# Patient Record
Sex: FEMALE | Race: WHITE | NOT HISPANIC OR LATINO | Employment: UNEMPLOYED | URBAN - METROPOLITAN AREA
[De-identification: names, ages, dates, MRNs, and addresses within clinical notes are randomized per-mention and may not be internally consistent; named-entity substitution may affect disease eponyms.]

---

## 2019-06-07 ENCOUNTER — MEDICAL CORRESPONDENCE (OUTPATIENT)
Dept: HEALTH INFORMATION MANAGEMENT | Facility: CLINIC | Age: 64
End: 2019-06-07

## 2019-06-12 ENCOUNTER — DOCUMENTATION ONLY (OUTPATIENT)
Dept: TRANSPLANT | Facility: CLINIC | Age: 64
End: 2019-06-12

## 2019-06-19 PROBLEM — K70.31 ALCOHOLIC CIRRHOSIS OF LIVER WITH ASCITES (H): Status: ACTIVE | Noted: 2019-06-19

## 2019-06-20 ENCOUNTER — TELEPHONE (OUTPATIENT)
Dept: TRANSPLANT | Facility: CLINIC | Age: 64
End: 2019-06-20

## 2019-06-25 ENCOUNTER — OFFICE VISIT (OUTPATIENT)
Dept: GASTROENTEROLOGY | Facility: CLINIC | Age: 64
End: 2019-06-25
Attending: INTERNAL MEDICINE
Payer: COMMERCIAL

## 2019-06-25 ENCOUNTER — APPOINTMENT (OUTPATIENT)
Dept: TRANSPLANT | Facility: CLINIC | Age: 64
End: 2019-06-25
Attending: INTERNAL MEDICINE
Payer: COMMERCIAL

## 2019-06-25 VITALS
RESPIRATION RATE: 18 BRPM | HEART RATE: 58 BPM | TEMPERATURE: 98 F | WEIGHT: 143.3 LBS | HEIGHT: 66 IN | DIASTOLIC BLOOD PRESSURE: 64 MMHG | BODY MASS INDEX: 23.03 KG/M2 | SYSTOLIC BLOOD PRESSURE: 121 MMHG

## 2019-06-25 DIAGNOSIS — K70.31 ALCOHOLIC CIRRHOSIS OF LIVER WITH ASCITES (H): ICD-10-CM

## 2019-06-25 LAB
ALBUMIN SERPL-MCNC: 2.3 G/DL (ref 3.4–5)
ALP SERPL-CCNC: 386 U/L (ref 40–150)
ALT SERPL W P-5'-P-CCNC: 23 U/L (ref 0–50)
ANION GAP SERPL CALCULATED.3IONS-SCNC: 8 MMOL/L (ref 3–14)
AST SERPL W P-5'-P-CCNC: 45 U/L (ref 0–45)
BILIRUB DIRECT SERPL-MCNC: 1.3 MG/DL (ref 0–0.2)
BILIRUB SERPL-MCNC: 2.8 MG/DL (ref 0.2–1.3)
BUN SERPL-MCNC: 7 MG/DL (ref 7–30)
CALCIUM SERPL-MCNC: 9.3 MG/DL (ref 8.5–10.1)
CHLORIDE SERPL-SCNC: 101 MMOL/L (ref 94–109)
CO2 SERPL-SCNC: 26 MMOL/L (ref 20–32)
CREAT SERPL-MCNC: 0.65 MG/DL (ref 0.52–1.04)
ERYTHROCYTE [DISTWIDTH] IN BLOOD BY AUTOMATED COUNT: 15.9 % (ref 10–15)
GFR SERPL CREATININE-BSD FRML MDRD: >90 ML/MIN/{1.73_M2}
GLUCOSE SERPL-MCNC: 173 MG/DL (ref 70–99)
HCT VFR BLD AUTO: 36.4 % (ref 35–47)
HGB BLD-MCNC: 12.5 G/DL (ref 11.7–15.7)
INR PPP: 1.17 (ref 0.86–1.14)
MCH RBC QN AUTO: 32.7 PG (ref 26.5–33)
MCHC RBC AUTO-ENTMCNC: 34.3 G/DL (ref 31.5–36.5)
MCV RBC AUTO: 95 FL (ref 78–100)
PLATELET # BLD AUTO: 154 10E9/L (ref 150–450)
POTASSIUM SERPL-SCNC: 3.4 MMOL/L (ref 3.4–5.3)
PROT SERPL-MCNC: 6.4 G/DL (ref 6.8–8.8)
RBC # BLD AUTO: 3.82 10E12/L (ref 3.8–5.2)
SODIUM SERPL-SCNC: 135 MMOL/L (ref 133–144)
WBC # BLD AUTO: 7.8 10E9/L (ref 4–11)

## 2019-06-25 PROCEDURE — 85027 COMPLETE CBC AUTOMATED: CPT | Performed by: INTERNAL MEDICINE

## 2019-06-25 PROCEDURE — 80076 HEPATIC FUNCTION PANEL: CPT | Performed by: INTERNAL MEDICINE

## 2019-06-25 PROCEDURE — G0463 HOSPITAL OUTPT CLINIC VISIT: HCPCS | Mod: ZF

## 2019-06-25 PROCEDURE — 85610 PROTHROMBIN TIME: CPT | Performed by: INTERNAL MEDICINE

## 2019-06-25 PROCEDURE — 80048 BASIC METABOLIC PNL TOTAL CA: CPT | Performed by: INTERNAL MEDICINE

## 2019-06-25 PROCEDURE — 36415 COLL VENOUS BLD VENIPUNCTURE: CPT | Performed by: INTERNAL MEDICINE

## 2019-06-25 RX ORDER — LACTULOSE 10 G/15ML
30 SOLUTION ORAL 3 TIMES DAILY
COMMUNITY
Start: 2019-05-13

## 2019-06-25 RX ORDER — POTASSIUM CHLORIDE 750 MG/1
10 CAPSULE, EXTENDED RELEASE ORAL EVERY EVENING
COMMUNITY
Start: 2019-04-03

## 2019-06-25 RX ORDER — FUROSEMIDE 20 MG
TABLET ORAL
Refills: 11 | Status: ON HOLD | COMMUNITY
Start: 2019-06-07 | End: 2019-09-30 | Stop reason: DRUGHIGH

## 2019-06-25 RX ORDER — SPIRONOLACTONE 50 MG/1
50 TABLET, FILM COATED ORAL 2 TIMES DAILY
Refills: 3 | COMMUNITY
Start: 2019-06-07

## 2019-06-25 ASSESSMENT — PAIN SCALES - GENERAL: PAINLEVEL: NO PAIN (0)

## 2019-06-25 ASSESSMENT — MIFFLIN-ST. JEOR: SCORE: 1231.75

## 2019-06-25 NOTE — PROGRESS NOTES
[facility]    Hepatology New Patient Visit    Referring provider:  Irene RIDLEY    CHIEF COMPLAINT/REASON FOR THE VISIT:  Alcoholic hepatitis/alcoholic cirrhosis.      HISTORY OF PRESENT ILLNESS:  Ms. Fatima is a 63-year-old white female with significant alcohol abuse and she is coming to us for transplant evaluation.  In summary, it appears that she was drinking a significant amount of alcohol, and she did develop alcoholic hepatitis/cirrhosis.  At that time, she did present with fluid retention and did require close to 5 paracentesis.  Her liver related medical  problems started almost 6 months ago.  At that time, she did get a 30-day course of prednisolone for the AH.  She did not have any significant confusion or memory issues.  She did not have any GI bleeding like hematemesis, melena or hematochezia.  She did lose some muscle mass.  She lost also weight, although there was some fluctuation with the fluid changes.  She denies any abdominal pain.  She did not have any SBP.  Denies any nausea or vomiting now.     In 05/26 she did have some worsening of her labs; in fact, she did develop right upper quadrant abdominal pain with radiation to the back and she was diagnosed with what appeared to be choledocholithiasis and had ERCP with sphincterotomy done and her abdominal pain improved.  She is now staying without abdominal pain.  Please note also that initially she did have some jaundice and pruritus, but the pruritus continued while the jaundice quasi resolved.  She is on lactulose and gets 3-4 bowel movements.  Never had a colonoscopy.  She did have an EGD and this EGD showed portal hypertensive gastropathy but no esophageal varices.     Medical hx Surgical hx   Past Medical History:   Diagnosis Date     Acute alcoholic hepatitis      Cirrhosis (H)      H/O migraine       Past Surgical History:   Procedure Laterality Date     ABDOMEN SURGERY      C Section      GYN SURGERY    "   1993 and 1995 C Section           Medications  Prior to Admission medications    Medication Sig Start Date End Date Taking? Authorizing Provider   furosemide (LASIX) 20 MG tablet TAKE 3 TABLETS BY MOUTH EVERY DAY 6/7/19  Yes Reported, Patient   lactulose (CHRONULAC) 10 GM/15ML solution Take 30 mLs by mouth 5/13/19  Yes Reported, Patient   omeprazole (PRILOSEC) 20 MG DR capsule Take 20 mg by mouth 4/19/19  Yes Reported, Patient   potassium chloride ER (MICRO-K) 10 MEQ CR capsule Take 10 mEq by mouth 4/3/19  Yes Reported, Patient   spironolactone (ALDACTONE) 50 MG tablet TAKE 2 TABLETS BY MOUTH EVERY DAY 6/7/19  Yes Reported, Patient       Allergies  No Known Allergies    Family hx Social hx   Family History   Problem Relation Age of Onset     Diabetes Type 2  Mother      Coronary Artery Disease Mother      Liver Cancer Maternal Grandfather      Diabetes Type 2  Father      Cerebrovascular Disease Father      Diabetes Type 2  Brother       Social History     Tobacco Use     Smoking status: Current Every Day Smoker     Packs/day: 0.30     Years: 12.00     Pack years: 3.60     Types: Cigarettes     Smokeless tobacco: Never Used   Substance Use Topics     Alcohol use: Not Currently     Frequency: Never     Comment: Quit Jan 2, 2019. Drank 7 drinks a week.      Drug use: Never          REVIEW OF SYSTEMS:  Ms. Fatima denies any recent infections.  She has fatigue and weakness, but denies any headaches or seizures.  She has some cough as she is smoking, usually in the morning, and denies any shortness of breath.  Denies any chest pain.  Has some anemia or easy bruising.  She is not diabetic.  She has migraine headaches.  She has significant alcohol use.  She has tinnitus, otherwise she has no eye issues.  She has no joint problems.  Otherwise, a comprehensive review of systems was noncontributory.     Examination  /64   Pulse 58   Temp 98  F (36.7  C) (Oral)   Resp 18   Ht 1.676 m (5' 6\")   Wt 65 kg (143 lb " 4.8 oz)   BMI 23.13 kg/m    Body mass index is 23.13 kg/m .    Gen- well, NAD, A+Ox3, normal color  Eye- EOMI  ENT- MMM, normal oropharynx  Lym- no palpable lymphadenopathy  CVS- S1, S2 normal, no added sounds, RRR  RS- CTA  Abd- Distended. No shifting dullness.  Extr- pulses good, no RADHA  MS- hands normal- no clubbing  Neuro- A+Ox3, no asterixis  Skin- no rash or jaundice  Psych- normal mood    Laboratory  Lab Results   Component Value Date     06/25/2019    POTASSIUM 3.4 06/25/2019    CHLORIDE 101 06/25/2019    CO2 26 06/25/2019    BUN 7 06/25/2019    CR 0.65 06/25/2019       Lab Results   Component Value Date    BILITOTAL 2.8 06/25/2019    ALT 23 06/25/2019    AST 45 06/25/2019    ALKPHOS 386 06/25/2019       Lab Results   Component Value Date    ALBUMIN 2.3 06/25/2019    PROTTOTAL 6.4 06/25/2019        Lab Results   Component Value Date    WBC 7.8 06/25/2019    HGB 12.5 06/25/2019    MCV 95 06/25/2019     06/25/2019       Lab Results   Component Value Date    INR 1.17 06/25/2019     MELD-Na score: 14 at 6/25/2019  8:40 AM  MELD score: 12 at 6/25/2019  8:40 AM  Calculated from:  Serum Creatinine: 0.65 mg/dL (Rounded to 1 mg/dL) at 6/25/2019  8:40 AM  Serum Sodium: 135 mmol/L at 6/25/2019  8:40 AM  Total Bilirubin: 2.8 mg/dL at 6/25/2019  8:40 AM  INR(ratio): 1.17 at 6/25/2019  8:40 AM  Age: 63 years      Radiology    ASSESSMENT AND PLAN:  Ms. Fatima is a 63-year-old white female with a history significant of alcohol abuse who was at the end of last year, beginning of this year, did develop fluid retention which later on came to be related with alcoholic hepatitis/cirrhosis.  She did have around 5 paracenteses, but in the last 1 month she did not have any.  She did not have any esophageal varices on upper endoscopy.  She had also choledocholithiasis, which responded to sphincterotomy and ERCP extraction.  She still has her gallbladder.  Her MELD score on her labs here is 14, which is below the  listing criteria.  We advised her that she can follow up with her gastroenterologist at [facility] and if her MELD score goes up or if she decides to go forward with a living donor, she can return here.  In my opinion she has an alcoholic hepatitis component which has improved upon abstinence.  She can still improve.  As far as other causes of liver disease she had negative markers for autoimmune hepatitis and she has nonreactive viral hepatitis B, C.  Her iron studies showed no increased iron saturation and hence hemochromatosis will be less likely.  She has not done alpha-1 antitrypsin phenotype and she will do that through her primary care physician.  As far as a cholecystectomy is concerned, once her liver function tests stabilize she can come back here to be reevaluated for cholecystectomy.  Please note that her platelet count is 154,000.      This was a 1 hour visit, of which more than 50% was spent in explaining to the patient what our plan of care was.  We answered all of her questions and those of her .       cc:  Primary care physician     Sharmila Chaidez MD  Hepatology  [facility]

## 2019-06-25 NOTE — LETTER
6/25/2019      RE: Hazel Fatima  [address]  [facility] [address]       [facility]    Hepatology New Patient Visit    Referring provider:  Irene RIDLEY    CHIEF COMPLAINT/REASON FOR THE VISIT:  Alcoholic hepatitis/alcoholic cirrhosis.      HISTORY OF PRESENT ILLNESS:  Ms. Fatima is a 63-year-old white female with significant alcohol abuse and she is coming to us for transplant evaluation.  In summary, it appears that she was drinking a significant amount of alcohol, and she did develop alcoholic hepatitis/cirrhosis.  At that time, she did present with fluid retention and did require close to 5 paracentesis.  Her liver related medical  problems started almost 6 months ago.  At that time, she did get a 30-day course of prednisolone for the AH.  She did not have any significant confusion or memory issues.  She did not have any GI bleeding like hematemesis, melena or hematochezia.  She did lose some muscle mass.  She lost also weight, although there was some fluctuation with the fluid changes.  She denies any abdominal pain.  She did not have any SBP.  Denies any nausea or vomiting now.     In 05/26 she did have some worsening of her labs; in fact, she did develop right upper quadrant abdominal pain with radiation to the back and she was diagnosed with what appeared to be choledocholithiasis and had ERCP with sphincterotomy done and her abdominal pain improved.  She is now staying without abdominal pain.  Please note also that initially she did have some jaundice and pruritus, but the pruritus continued while the jaundice quasi resolved.  She is on lactulose and gets 3-4 bowel movements.  Never had a colonoscopy.  She did have an EGD and this EGD showed portal hypertensive gastropathy but no esophageal varices.     Medical hx Surgical hx   Past Medical History:   Diagnosis Date     Acute alcoholic hepatitis      Cirrhosis (H)      H/O migraine       Past Surgical History:    Procedure Laterality Date     ABDOMEN SURGERY      C Section      GYN SURGERY      1993 and 1995 C Section           Medications  Prior to Admission medications    Medication Sig Start Date End Date Taking? Authorizing Provider   furosemide (LASIX) 20 MG tablet TAKE 3 TABLETS BY MOUTH EVERY DAY 6/7/19  Yes Reported, Patient   lactulose (CHRONULAC) 10 GM/15ML solution Take 30 mLs by mouth 5/13/19  Yes Reported, Patient   omeprazole (PRILOSEC) 20 MG DR capsule Take 20 mg by mouth 4/19/19  Yes Reported, Patient   potassium chloride ER (MICRO-K) 10 MEQ CR capsule Take 10 mEq by mouth 4/3/19  Yes Reported, Patient   spironolactone (ALDACTONE) 50 MG tablet TAKE 2 TABLETS BY MOUTH EVERY DAY 6/7/19  Yes Reported, Patient       Allergies  No Known Allergies    Family hx Social hx   Family History   Problem Relation Age of Onset     Diabetes Type 2  Mother      Coronary Artery Disease Mother      Liver Cancer Maternal Grandfather      Diabetes Type 2  Father      Cerebrovascular Disease Father      Diabetes Type 2  Brother       Social History     Tobacco Use     Smoking status: Current Every Day Smoker     Packs/day: 0.30     Years: 12.00     Pack years: 3.60     Types: Cigarettes     Smokeless tobacco: Never Used   Substance Use Topics     Alcohol use: Not Currently     Frequency: Never     Comment: Quit Jan 2, 2019. Drank 7 drinks a week.      Drug use: Never          REVIEW OF SYSTEMS:  Ms. Fatima denies any recent infections.  She has fatigue and weakness, but denies any headaches or seizures.  She has some cough as she is smoking, usually in the morning, and denies any shortness of breath.  Denies any chest pain.  Has some anemia or easy bruising.  She is not diabetic.  She has migraine headaches.  She has significant alcohol use.  She has tinnitus, otherwise she has no eye issues.  She has no joint problems.  Otherwise, a comprehensive review of systems was noncontributory.     Examination  /64   Pulse 58    "Temp 98  F (36.7  C) (Oral)   Resp 18   Ht 1.676 m (5' 6\")   Wt 65 kg (143 lb 4.8 oz)   BMI 23.13 kg/m     Body mass index is 23.13 kg/m .    Gen- well, NAD, A+Ox3, normal color  Eye- EOMI  ENT- MMM, normal oropharynx  Lym- no palpable lymphadenopathy  CVS- S1, S2 normal, no added sounds, RRR  RS- CTA  Abd- Distended. No shifting dullness.  Extr- pulses good, no RADHA  MS- hands normal- no clubbing  Neuro- A+Ox3, no asterixis  Skin- no rash or jaundice  Psych- normal mood    Laboratory  Lab Results   Component Value Date     06/25/2019    POTASSIUM 3.4 06/25/2019    CHLORIDE 101 06/25/2019    CO2 26 06/25/2019    BUN 7 06/25/2019    CR 0.65 06/25/2019       Lab Results   Component Value Date    BILITOTAL 2.8 06/25/2019    ALT 23 06/25/2019    AST 45 06/25/2019    ALKPHOS 386 06/25/2019       Lab Results   Component Value Date    ALBUMIN 2.3 06/25/2019    PROTTOTAL 6.4 06/25/2019        Lab Results   Component Value Date    WBC 7.8 06/25/2019    HGB 12.5 06/25/2019    MCV 95 06/25/2019     06/25/2019       Lab Results   Component Value Date    INR 1.17 06/25/2019     MELD-Na score: 14 at 6/25/2019  8:40 AM  MELD score: 12 at 6/25/2019  8:40 AM  Calculated from:  Serum Creatinine: 0.65 mg/dL (Rounded to 1 mg/dL) at 6/25/2019  8:40 AM  Serum Sodium: 135 mmol/L at 6/25/2019  8:40 AM  Total Bilirubin: 2.8 mg/dL at 6/25/2019  8:40 AM  INR(ratio): 1.17 at 6/25/2019  8:40 AM  Age: 63 years      Radiology    ASSESSMENT AND PLAN:  Ms. Fatiam is a 63-year-old white female with a history significant of alcohol abuse who was at the end of last year, beginning of this year, did develop fluid retention which later on came to be related with alcoholic hepatitis/cirrhosis.  She did have around 5 paracenteses, but in the last 1 month she did not have any.  She did not have any esophageal varices on upper endoscopy.  She had also choledocholithiasis, which responded to sphincterotomy and ERCP extraction.  She still has " her gallbladder.  Her MELD score on her labs here is 14, which is below the listing criteria.  We advised her that she can follow up with her gastroenterologist at [facility] and if her MELD score goes up or if she decides to go forward with a living donor, she can return here.  In my opinion she has an alcoholic hepatitis component which has improved upon abstinence.  She can still improve.  As far as other causes of liver disease she had negative markers for autoimmune hepatitis and she has nonreactive viral hepatitis B, C.  Her iron studies showed no increased iron saturation and hence hemochromatosis will be less likely.  She has not done alpha-1 antitrypsin phenotype and she will do that through her primary care physician.  As far as a cholecystectomy is concerned, once her liver function tests stabilize she can come back here to be reevaluated for cholecystectomy.  Please note that her platelet count is 154,000.      This was a 1 hour visit, of which more than 50% was spent in explaining to the patient what our plan of care was.  We answered all of her questions and those of her .       cc:  Primary care physician     Sharmila Chaidez MD  Hepatology  [facility]        Sharmila Chaidez MD

## 2019-06-25 NOTE — LETTER
Date:June 27, 2019      Patient was self referred, no letter generated. Do not send.        [facility] Health Information

## 2019-06-25 NOTE — LETTER
6/25/2019       RE: Hazel Fatima  [address]  [place] [address]     Dear Colleague,    Thank you for referring your patient, Hazel Fatima, to the [facility] HEPATOLOGY at [facility]. Please see a copy of my visit note below.    [facility]    Hepatology New Patient Visit    Referring provider:  Irene RIDLEY    CHIEF COMPLAINT/REASON FOR THE VISIT:  Alcoholic hepatitis/alcoholic cirrhosis.      HISTORY OF PRESENT ILLNESS:  Ms. Fatima is a 63-year-old white female with significant alcohol abuse and she is coming to us for transplant evaluation.  In summary, it appears that she was drinking a significant amount of alcohol, and she did develop alcoholic hepatitis/cirrhosis.  At that time, she did present with fluid retention and did require close to 5 paracentesis.  Her liver related medical  problems started almost 6 months ago.  At that time, she did get a 30-day course of prednisolone for the AH.  She did not have any significant confusion or memory issues.  She did not have any GI bleeding like hematemesis, melena or hematochezia.  She did lose some muscle mass.  She lost also weight, although there was some fluctuation with the fluid changes.  She denies any abdominal pain.  She did not have any SBP.  Denies any nausea or vomiting now.     In 05/26 she did have some worsening of her labs; in fact, she did develop right upper quadrant abdominal pain with radiation to the back and she was diagnosed with what appeared to be choledocholithiasis and had ERCP with sphincterotomy done and her abdominal pain improved.  She is now staying without abdominal pain.  Please note also that initially she did have some jaundice and pruritus, but the pruritus continued while the jaundice quasi resolved.  She is on lactulose and gets 3-4 bowel movements.  Never had a colonoscopy.  She did have an EGD and this EGD showed portal hypertensive  gastropathy but no esophageal varices.     Medical hx Surgical hx   Past Medical History:   Diagnosis Date     Acute alcoholic hepatitis      Cirrhosis (H)      H/O migraine       Past Surgical History:   Procedure Laterality Date     ABDOMEN SURGERY      C Section      GYN SURGERY      1993 and 1995 C Section           Medications  Prior to Admission medications    Medication Sig Start Date End Date Taking? Authorizing Provider   furosemide (LASIX) 20 MG tablet TAKE 3 TABLETS BY MOUTH EVERY DAY 6/7/19  Yes Reported, Patient   lactulose (CHRONULAC) 10 GM/15ML solution Take 30 mLs by mouth 5/13/19  Yes Reported, Patient   omeprazole (PRILOSEC) 20 MG DR capsule Take 20 mg by mouth 4/19/19  Yes Reported, Patient   potassium chloride ER (MICRO-K) 10 MEQ CR capsule Take 10 mEq by mouth 4/3/19  Yes Reported, Patient   spironolactone (ALDACTONE) 50 MG tablet TAKE 2 TABLETS BY MOUTH EVERY DAY 6/7/19  Yes Reported, Patient       Allergies  No Known Allergies    Family hx Social hx   Family History   Problem Relation Age of Onset     Diabetes Type 2  Mother      Coronary Artery Disease Mother      Liver Cancer Maternal Grandfather      Diabetes Type 2  Father      Cerebrovascular Disease Father      Diabetes Type 2  Brother       Social History     Tobacco Use     Smoking status: Current Every Day Smoker     Packs/day: 0.30     Years: 12.00     Pack years: 3.60     Types: Cigarettes     Smokeless tobacco: Never Used   Substance Use Topics     Alcohol use: Not Currently     Frequency: Never     Comment: Quit Jan 2, 2019. Drank 7 drinks a week.      Drug use: Never          REVIEW OF SYSTEMS:  Ms. Fatima denies any recent infections.  She has fatigue and weakness, but denies any headaches or seizures.  She has some cough as she is smoking, usually in the morning, and denies any shortness of breath.  Denies any chest pain.  Has some anemia or easy bruising.  She is not diabetic.  She has migraine headaches.  She has significant  "alcohol use.  She has tinnitus, otherwise she has no eye issues.  She has no joint problems.  Otherwise, a comprehensive review of systems was noncontributory.     Examination  /64   Pulse 58   Temp 98  F (36.7  C) (Oral)   Resp 18   Ht 1.676 m (5' 6\")   Wt 65 kg (143 lb 4.8 oz)   BMI 23.13 kg/m     Body mass index is 23.13 kg/m .    Gen- well, NAD, A+Ox3, normal color  Eye- EOMI  ENT- MMM, normal oropharynx  Lym- no palpable lymphadenopathy  CVS- S1, S2 normal, no added sounds, RRR  RS- CTA  Abd- Distended. No shifting dullness.  Extr- pulses good, no RADHA  MS- hands normal- no clubbing  Neuro- A+Ox3, no asterixis  Skin- no rash or jaundice  Psych- normal mood    Laboratory  Lab Results   Component Value Date     06/25/2019    POTASSIUM 3.4 06/25/2019    CHLORIDE 101 06/25/2019    CO2 26 06/25/2019    BUN 7 06/25/2019    CR 0.65 06/25/2019       Lab Results   Component Value Date    BILITOTAL 2.8 06/25/2019    ALT 23 06/25/2019    AST 45 06/25/2019    ALKPHOS 386 06/25/2019       Lab Results   Component Value Date    ALBUMIN 2.3 06/25/2019    PROTTOTAL 6.4 06/25/2019        Lab Results   Component Value Date    WBC 7.8 06/25/2019    HGB 12.5 06/25/2019    MCV 95 06/25/2019     06/25/2019       Lab Results   Component Value Date    INR 1.17 06/25/2019     MELD-Na score: 14 at 6/25/2019  8:40 AM  MELD score: 12 at 6/25/2019  8:40 AM  Calculated from:  Serum Creatinine: 0.65 mg/dL (Rounded to 1 mg/dL) at 6/25/2019  8:40 AM  Serum Sodium: 135 mmol/L at 6/25/2019  8:40 AM  Total Bilirubin: 2.8 mg/dL at 6/25/2019  8:40 AM  INR(ratio): 1.17 at 6/25/2019  8:40 AM  Age: 63 years      Radiology    ASSESSMENT AND PLAN:  Ms. Fatima is a 63-year-old white female with a history significant of alcohol abuse who was at the end of last year, beginning of this year, did develop fluid retention which later on came to be related with alcoholic hepatitis/cirrhosis.  She did have around 5 paracenteses, but in " the last 1 month she did not have any.  She did not have any esophageal varices on upper endoscopy.  She had also choledocholithiasis, which responded to sphincterotomy and ERCP extraction.  She still has her gallbladder.  Her MELD score on her labs here is 14, which is below the listing criteria.  We advised her that she can follow up with her gastroenterologist at [facility] and if her MELD score goes up or if she decides to go forward with a living donor, she can return here.  In my opinion she has an alcoholic hepatitis component which has improved upon abstinence.  She can still improve.  As far as other causes of liver disease she had negative markers for autoimmune hepatitis and she has nonreactive viral hepatitis B, C.  Her iron studies showed no increased iron saturation and hence hemochromatosis will be less likely.  She has not done alpha-1 antitrypsin phenotype and she will do that through her primary care physician.  As far as a cholecystectomy is concerned, once her liver function tests stabilize she can come back here to be reevaluated for cholecystectomy.  Please note that her platelet count is 154,000.      This was a 1 hour visit, of which more than 50% was spent in explaining to the patient what our plan of care was.  We answered all of her questions and those of her .       cc:  Primary care physician     Sharmila Chaidez MD  Hepatology  [facility]        Again, thank you for allowing me to participate in the care of your patient.      Sincerely,    Sharmila Chaidez MD

## 2019-06-25 NOTE — LETTER
Date:June 27, 2019      Patient was self referred, no letter generated. Do not send.        [facility] Health Information

## 2019-06-25 NOTE — NURSING NOTE
"Chief Complaint   Patient presents with     Transplant Evaluation     Liver eval     Blood pressure 121/64, pulse 58, temperature 98  F (36.7  C), temperature source Oral, resp. rate 18, height 1.676 m (5' 6\"), weight 65 kg (143 lb 4.8 oz).    Mark Leos CMA on 6/25/2019 at 10:11 AM    "

## 2019-09-19 ENCOUNTER — DOCUMENTATION ONLY (OUTPATIENT)
Dept: CARE COORDINATION | Facility: CLINIC | Age: 64
End: 2019-09-19

## 2019-09-24 ENCOUNTER — TRANSFERRED RECORDS (OUTPATIENT)
Dept: HEALTH INFORMATION MANAGEMENT | Facility: CLINIC | Age: 64
End: 2019-09-24

## 2019-09-27 ENCOUNTER — PATIENT OUTREACH (OUTPATIENT)
Dept: SURGERY | Facility: CLINIC | Age: 64
End: 2019-09-27

## 2019-09-27 NOTE — PROGRESS NOTES
Pre Visit Call and Assessment    Date of call:  09/27/2019    Phone numbers:  Cell number on file:    Telephone Information:   Mobile [phone]       Reached patient/confirmed appointment:  No - left message:   on cell phone  Patient care team/Primary provider:  No Ref-Primary, Physician  Preferred outpatient pharmacy:  No Pharmacies Listed  Referred to:  Dr. Nasir Irwin    Reason for visit:  Consult- gallbladder

## 2019-09-29 ENCOUNTER — APPOINTMENT (OUTPATIENT)
Dept: ULTRASOUND IMAGING | Facility: CLINIC | Age: 64
DRG: 445 | End: 2019-09-29
Attending: FAMILY MEDICINE
Payer: COMMERCIAL

## 2019-09-29 ENCOUNTER — HOSPITAL ENCOUNTER (INPATIENT)
Facility: CLINIC | Age: 64
LOS: 4 days | Discharge: HOME | DRG: 445 | End: 2019-10-03
Attending: FAMILY MEDICINE | Admitting: INTERNAL MEDICINE
Payer: COMMERCIAL

## 2019-09-29 DIAGNOSIS — K80.50 RECURRENT BILIARY COLIC: ICD-10-CM

## 2019-09-29 DIAGNOSIS — K81.9 CHOLECYSTITIS: ICD-10-CM

## 2019-09-29 DIAGNOSIS — K80.20 CALCULUS OF GALLBLADDER WITHOUT CHOLECYSTITIS WITHOUT OBSTRUCTION: ICD-10-CM

## 2019-09-29 DIAGNOSIS — R10.9 ABDOMINAL PAIN, UNSPECIFIED ABDOMINAL LOCATION: Primary | ICD-10-CM

## 2019-09-29 DIAGNOSIS — K70.31 ALCOHOLIC CIRRHOSIS OF LIVER WITH ASCITES (H): ICD-10-CM

## 2019-09-29 LAB
ABO + RH BLD: NORMAL
ABO + RH BLD: NORMAL
ALBUMIN SERPL-MCNC: 2.2 G/DL (ref 3.4–5)
ALBUMIN UR-MCNC: NEGATIVE MG/DL
ALP SERPL-CCNC: 112 U/L (ref 40–150)
ALT SERPL W P-5'-P-CCNC: 14 U/L (ref 0–50)
ANION GAP SERPL CALCULATED.3IONS-SCNC: 9 MMOL/L (ref 3–14)
APPEARANCE UR: ABNORMAL
APTT PPP: 31 SEC (ref 22–37)
AST SERPL W P-5'-P-CCNC: 26 U/L (ref 0–45)
BASOPHILS # BLD AUTO: 0 10E9/L (ref 0–0.2)
BASOPHILS NFR BLD AUTO: 0.3 %
BILIRUB SERPL-MCNC: 2.4 MG/DL (ref 0.2–1.3)
BILIRUB UR QL STRIP: NEGATIVE
BLD GP AB SCN SERPL QL: NORMAL
BLOOD BANK CMNT PATIENT-IMP: NORMAL
BUN SERPL-MCNC: 9 MG/DL (ref 7–30)
CALCIUM SERPL-MCNC: 9 MG/DL (ref 8.5–10.1)
CHLORIDE SERPL-SCNC: 101 MMOL/L (ref 94–109)
CO2 BLDCOV-SCNC: 28 MMOL/L (ref 21–28)
CO2 SERPL-SCNC: 25 MMOL/L (ref 20–32)
COLOR UR AUTO: YELLOW
CREAT SERPL-MCNC: 0.68 MG/DL (ref 0.52–1.04)
DIFFERENTIAL METHOD BLD: ABNORMAL
DIFFERENTIAL METHOD BLD: NORMAL
EOSINOPHIL # BLD AUTO: 0.1 10E9/L (ref 0–0.7)
EOSINOPHIL NFR BLD AUTO: 0.9 %
ERYTHROCYTE [DISTWIDTH] IN BLOOD BY AUTOMATED COUNT: 15.4 % (ref 10–15)
ERYTHROCYTE [DISTWIDTH] IN BLOOD BY AUTOMATED COUNT: NORMAL % (ref 10–15)
GFR SERPL CREATININE-BSD FRML MDRD: >90 ML/MIN/{1.73_M2}
GLUCOSE BLDC GLUCOMTR-MCNC: 86 MG/DL (ref 70–99)
GLUCOSE SERPL-MCNC: 127 MG/DL (ref 70–99)
GLUCOSE UR STRIP-MCNC: NEGATIVE MG/DL
HCT VFR BLD AUTO: 36.2 % (ref 35–47)
HCT VFR BLD AUTO: NORMAL % (ref 35–47)
HGB BLD-MCNC: 12.2 G/DL (ref 11.7–15.7)
HGB BLD-MCNC: NORMAL G/DL (ref 11.7–15.7)
HGB UR QL STRIP: ABNORMAL
IMM GRANULOCYTES # BLD: 0.1 10E9/L (ref 0–0.4)
IMM GRANULOCYTES NFR BLD: 0.6 %
INR PPP: 1.33 (ref 0.86–1.14)
INTERPRETATION ECG - MUSE: NORMAL
KETONES UR STRIP-MCNC: NEGATIVE MG/DL
LACTATE BLD-SCNC: 1.4 MMOL/L (ref 0.7–2.1)
LEUKOCYTE ESTERASE UR QL STRIP: ABNORMAL
LIPASE SERPL-CCNC: 150 U/L (ref 73–393)
LYMPHOCYTES # BLD AUTO: 1.6 10E9/L (ref 0.8–5.3)
LYMPHOCYTES NFR BLD AUTO: 13.8 %
MCH RBC QN AUTO: 31.9 PG (ref 26.5–33)
MCH RBC QN AUTO: NORMAL PG (ref 26.5–33)
MCHC RBC AUTO-ENTMCNC: 33.7 G/DL (ref 31.5–36.5)
MCHC RBC AUTO-ENTMCNC: NORMAL G/DL (ref 31.5–36.5)
MCV RBC AUTO: 95 FL (ref 78–100)
MCV RBC AUTO: NORMAL FL (ref 78–100)
MONOCYTES # BLD AUTO: 2 10E9/L (ref 0–1.3)
MONOCYTES NFR BLD AUTO: 17.1 %
NEUTROPHILS # BLD AUTO: 7.8 10E9/L (ref 1.6–8.3)
NEUTROPHILS NFR BLD AUTO: 67.3 %
NITRATE UR QL: NEGATIVE
NRBC # BLD AUTO: 0 10*3/UL
NRBC BLD AUTO-RTO: 0 /100
PCO2 BLDV: 39 MM HG (ref 40–50)
PH BLDV: 7.47 PH (ref 7.32–7.43)
PH UR STRIP: 5 PH (ref 5–7)
PLATELET # BLD AUTO: 180 10E9/L (ref 150–450)
PLATELET # BLD AUTO: NORMAL 10E9/L (ref 150–450)
PO2 BLDV: 26 MM HG (ref 25–47)
POTASSIUM SERPL-SCNC: 4 MMOL/L (ref 3.4–5.3)
PROT SERPL-MCNC: 6.1 G/DL (ref 6.8–8.8)
RBC # BLD AUTO: 3.83 10E12/L (ref 3.8–5.2)
RBC # BLD AUTO: NORMAL 10E12/L (ref 3.8–5.2)
RBC #/AREA URNS AUTO: 23 /HPF (ref 0–2)
SAO2 % BLDV FROM PO2: 53 %
SODIUM SERPL-SCNC: 135 MMOL/L (ref 133–144)
SOURCE: ABNORMAL
SP GR UR STRIP: 1.02 (ref 1–1.03)
SPECIMEN EXP DATE BLD: NORMAL
SQUAMOUS #/AREA URNS AUTO: 6 /HPF (ref 0–1)
TROPONIN I SERPL-MCNC: <0.015 UG/L (ref 0–0.04)
UROBILINOGEN UR STRIP-MCNC: NORMAL MG/DL (ref 0–2)
WBC # BLD AUTO: 11.5 10E9/L (ref 4–11)
WBC # BLD AUTO: NORMAL 10E9/L (ref 4–11)
WBC #/AREA URNS AUTO: 3 /HPF (ref 0–5)

## 2019-09-29 PROCEDURE — 12000001 ZZH R&B MED SURG/OB UMMC

## 2019-09-29 PROCEDURE — 83690 ASSAY OF LIPASE: CPT | Performed by: FAMILY MEDICINE

## 2019-09-29 PROCEDURE — 99285 EMERGENCY DEPT VISIT HI MDM: CPT | Mod: 25 | Performed by: FAMILY MEDICINE

## 2019-09-29 PROCEDURE — 99285 EMERGENCY DEPT VISIT HI MDM: CPT | Mod: 25

## 2019-09-29 PROCEDURE — 96376 TX/PRO/DX INJ SAME DRUG ADON: CPT

## 2019-09-29 PROCEDURE — 84484 ASSAY OF TROPONIN QUANT: CPT | Performed by: FAMILY MEDICINE

## 2019-09-29 PROCEDURE — 86901 BLOOD TYPING SEROLOGIC RH(D): CPT | Performed by: FAMILY MEDICINE

## 2019-09-29 PROCEDURE — 85025 COMPLETE CBC W/AUTO DIFF WBC: CPT | Performed by: EMERGENCY MEDICINE

## 2019-09-29 PROCEDURE — 85610 PROTHROMBIN TIME: CPT | Performed by: FAMILY MEDICINE

## 2019-09-29 PROCEDURE — 82803 BLOOD GASES ANY COMBINATION: CPT

## 2019-09-29 PROCEDURE — 87040 BLOOD CULTURE FOR BACTERIA: CPT | Performed by: FAMILY MEDICINE

## 2019-09-29 PROCEDURE — 83605 ASSAY OF LACTIC ACID: CPT

## 2019-09-29 PROCEDURE — 96365 THER/PROPH/DIAG IV INF INIT: CPT

## 2019-09-29 PROCEDURE — 85730 THROMBOPLASTIN TIME PARTIAL: CPT | Performed by: FAMILY MEDICINE

## 2019-09-29 PROCEDURE — 80053 COMPREHEN METABOLIC PANEL: CPT | Performed by: FAMILY MEDICINE

## 2019-09-29 PROCEDURE — C9113 INJ PANTOPRAZOLE SODIUM, VIA: HCPCS | Performed by: FAMILY MEDICINE

## 2019-09-29 PROCEDURE — 86850 RBC ANTIBODY SCREEN: CPT | Performed by: FAMILY MEDICINE

## 2019-09-29 PROCEDURE — 25000128 H RX IP 250 OP 636: Performed by: FAMILY MEDICINE

## 2019-09-29 PROCEDURE — 86900 BLOOD TYPING SEROLOGIC ABO: CPT | Performed by: FAMILY MEDICINE

## 2019-09-29 PROCEDURE — 25800030 ZZH RX IP 258 OP 636: Performed by: FAMILY MEDICINE

## 2019-09-29 PROCEDURE — 76705 ECHO EXAM OF ABDOMEN: CPT

## 2019-09-29 PROCEDURE — 25000132 ZZH RX MED GY IP 250 OP 250 PS 637: Performed by: STUDENT IN AN ORGANIZED HEALTH CARE EDUCATION/TRAINING PROGRAM

## 2019-09-29 PROCEDURE — 99223 1ST HOSP IP/OBS HIGH 75: CPT | Mod: AI | Performed by: INTERNAL MEDICINE

## 2019-09-29 PROCEDURE — 96375 TX/PRO/DX INJ NEW DRUG ADDON: CPT

## 2019-09-29 PROCEDURE — 00000146 ZZHCL STATISTIC GLUCOSE BY METER IP

## 2019-09-29 PROCEDURE — 96361 HYDRATE IV INFUSION ADD-ON: CPT

## 2019-09-29 PROCEDURE — 93010 ELECTROCARDIOGRAM REPORT: CPT | Mod: Z6 | Performed by: FAMILY MEDICINE

## 2019-09-29 PROCEDURE — 81001 URINALYSIS AUTO W/SCOPE: CPT | Performed by: STUDENT IN AN ORGANIZED HEALTH CARE EDUCATION/TRAINING PROGRAM

## 2019-09-29 PROCEDURE — 93005 ELECTROCARDIOGRAM TRACING: CPT

## 2019-09-29 RX ORDER — LIDOCAINE 40 MG/G
CREAM TOPICAL
Status: DISCONTINUED | OUTPATIENT
Start: 2019-09-29 | End: 2019-10-03 | Stop reason: HOSPADM

## 2019-09-29 RX ORDER — PIPERACILLIN SODIUM, TAZOBACTAM SODIUM 3; .375 G/15ML; G/15ML
3.38 INJECTION, POWDER, LYOPHILIZED, FOR SOLUTION INTRAVENOUS ONCE
Status: COMPLETED | OUTPATIENT
Start: 2019-09-29 | End: 2019-09-29

## 2019-09-29 RX ORDER — SODIUM CHLORIDE 9 MG/ML
1000 INJECTION, SOLUTION INTRAVENOUS CONTINUOUS
Status: DISCONTINUED | OUTPATIENT
Start: 2019-09-29 | End: 2019-10-01

## 2019-09-29 RX ORDER — LACTULOSE 10 G/15ML
30 SOLUTION ORAL
Status: DISCONTINUED | OUTPATIENT
Start: 2019-09-29 | End: 2019-10-02

## 2019-09-29 RX ORDER — LACTULOSE 10 G/15ML
100 SOLUTION ORAL
Status: DISCONTINUED | OUTPATIENT
Start: 2019-09-29 | End: 2019-10-02

## 2019-09-29 RX ORDER — FUROSEMIDE 20 MG
20 TABLET ORAL
Status: DISCONTINUED | OUTPATIENT
Start: 2019-09-30 | End: 2019-10-03 | Stop reason: HOSPADM

## 2019-09-29 RX ORDER — LACTULOSE 10 G/15ML
20 SOLUTION ORAL
Status: DISCONTINUED | OUTPATIENT
Start: 2019-09-29 | End: 2019-10-02

## 2019-09-29 RX ORDER — HYDROMORPHONE HYDROCHLORIDE 1 MG/ML
.3-.5 INJECTION, SOLUTION INTRAMUSCULAR; INTRAVENOUS; SUBCUTANEOUS
Status: COMPLETED | OUTPATIENT
Start: 2019-09-29 | End: 2019-09-29

## 2019-09-29 RX ORDER — ONDANSETRON 2 MG/ML
4 INJECTION INTRAMUSCULAR; INTRAVENOUS EVERY 30 MIN PRN
Status: COMPLETED | OUTPATIENT
Start: 2019-09-29 | End: 2019-10-01

## 2019-09-29 RX ORDER — LACTULOSE 10 G/15ML
30 SOLUTION ORAL 3 TIMES DAILY
Status: DISCONTINUED | OUTPATIENT
Start: 2019-09-29 | End: 2019-10-02

## 2019-09-29 RX ORDER — AMOXICILLIN 250 MG
2 CAPSULE ORAL 2 TIMES DAILY PRN
Status: DISCONTINUED | OUTPATIENT
Start: 2019-09-29 | End: 2019-10-03 | Stop reason: HOSPADM

## 2019-09-29 RX ORDER — ONDANSETRON 4 MG/1
4 TABLET, ORALLY DISINTEGRATING ORAL EVERY 6 HOURS PRN
COMMUNITY

## 2019-09-29 RX ORDER — NICOTINE 21 MG/24HR
1 PATCH, TRANSDERMAL 24 HOURS TRANSDERMAL DAILY
Status: DISCONTINUED | OUTPATIENT
Start: 2019-09-30 | End: 2019-10-03 | Stop reason: HOSPADM

## 2019-09-29 RX ORDER — NALOXONE HYDROCHLORIDE 0.4 MG/ML
.1-.4 INJECTION, SOLUTION INTRAMUSCULAR; INTRAVENOUS; SUBCUTANEOUS
Status: DISCONTINUED | OUTPATIENT
Start: 2019-09-29 | End: 2019-10-03 | Stop reason: HOSPADM

## 2019-09-29 RX ORDER — ACETAMINOPHEN 325 MG/1
650 TABLET ORAL EVERY 4 HOURS PRN
Status: DISCONTINUED | OUTPATIENT
Start: 2019-09-29 | End: 2019-10-03 | Stop reason: HOSPADM

## 2019-09-29 RX ORDER — POTASSIUM CHLORIDE 750 MG/1
10 TABLET, EXTENDED RELEASE ORAL DAILY
Status: DISCONTINUED | OUTPATIENT
Start: 2019-09-30 | End: 2019-10-03 | Stop reason: HOSPADM

## 2019-09-29 RX ORDER — POLYETHYLENE GLYCOL 3350 17 G
2 POWDER IN PACKET (EA) ORAL
Status: DISCONTINUED | OUTPATIENT
Start: 2019-09-29 | End: 2019-10-03 | Stop reason: HOSPADM

## 2019-09-29 RX ORDER — ONDANSETRON 4 MG/1
4 TABLET, ORALLY DISINTEGRATING ORAL EVERY 6 HOURS PRN
Status: DISCONTINUED | OUTPATIENT
Start: 2019-09-29 | End: 2019-10-03 | Stop reason: HOSPADM

## 2019-09-29 RX ORDER — LIDOCAINE 40 MG/G
CREAM TOPICAL
Status: DISCONTINUED | OUTPATIENT
Start: 2019-09-29 | End: 2019-09-30

## 2019-09-29 RX ORDER — AMOXICILLIN 250 MG
1 CAPSULE ORAL 2 TIMES DAILY PRN
Status: DISCONTINUED | OUTPATIENT
Start: 2019-09-29 | End: 2019-10-03 | Stop reason: HOSPADM

## 2019-09-29 RX ORDER — OXYCODONE HYDROCHLORIDE 5 MG/1
5 TABLET ORAL EVERY 4 HOURS PRN
Status: DISCONTINUED | OUTPATIENT
Start: 2019-09-29 | End: 2019-10-03 | Stop reason: HOSPADM

## 2019-09-29 RX ORDER — SPIRONOLACTONE 25 MG/1
50 TABLET ORAL
Status: DISCONTINUED | OUTPATIENT
Start: 2019-09-30 | End: 2019-10-03 | Stop reason: HOSPADM

## 2019-09-29 RX ORDER — PIPERACILLIN SODIUM, TAZOBACTAM SODIUM 3; .375 G/15ML; G/15ML
3.38 INJECTION, POWDER, LYOPHILIZED, FOR SOLUTION INTRAVENOUS EVERY 6 HOURS
Status: DISCONTINUED | OUTPATIENT
Start: 2019-09-30 | End: 2019-10-03 | Stop reason: HOSPADM

## 2019-09-29 RX ORDER — POTASSIUM CHLORIDE 750 MG/1
10 CAPSULE, EXTENDED RELEASE ORAL DAILY
Status: DISCONTINUED | OUTPATIENT
Start: 2019-09-30 | End: 2019-09-29

## 2019-09-29 RX ADMIN — OXYCODONE HYDROCHLORIDE 5 MG: 5 TABLET ORAL at 21:51

## 2019-09-29 RX ADMIN — SODIUM CHLORIDE 1000 ML: 9 INJECTION, SOLUTION INTRAVENOUS at 10:46

## 2019-09-29 RX ADMIN — LACTULOSE 30 ML: 20 SOLUTION ORAL at 20:38

## 2019-09-29 RX ADMIN — PIPERACILLIN SODIUM AND TAZOBACTAM SODIUM 3.38 G: 3; .375 INJECTION, POWDER, LYOPHILIZED, FOR SOLUTION INTRAVENOUS at 17:15

## 2019-09-29 RX ADMIN — ONDANSETRON 4 MG: 2 INJECTION INTRAMUSCULAR; INTRAVENOUS at 10:46

## 2019-09-29 RX ADMIN — HYDROMORPHONE HYDROCHLORIDE 0.5 MG: 1 INJECTION, SOLUTION INTRAMUSCULAR; INTRAVENOUS; SUBCUTANEOUS at 10:47

## 2019-09-29 RX ADMIN — HYDROMORPHONE HYDROCHLORIDE 0.5 MG: 1 INJECTION, SOLUTION INTRAMUSCULAR; INTRAVENOUS; SUBCUTANEOUS at 12:39

## 2019-09-29 RX ADMIN — HYDROMORPHONE HYDROCHLORIDE 0.5 MG: 1 INJECTION, SOLUTION INTRAMUSCULAR; INTRAVENOUS; SUBCUTANEOUS at 15:09

## 2019-09-29 RX ADMIN — RIFAXIMIN 550 MG: 550 TABLET ORAL at 20:38

## 2019-09-29 RX ADMIN — SODIUM CHLORIDE 1000 ML: 9 INJECTION, SOLUTION INTRAVENOUS at 12:39

## 2019-09-29 RX ADMIN — PANTOPRAZOLE SODIUM 40 MG: 40 INJECTION, POWDER, FOR SOLUTION INTRAVENOUS at 17:06

## 2019-09-29 ASSESSMENT — ENCOUNTER SYMPTOMS
BLOOD IN STOOL: 0
SHORTNESS OF BREATH: 0
NAUSEA: 1
ACTIVITY CHANGE: 1
FATIGUE: 1
DIFFICULTY URINATING: 0
WOUND: 0
WEAKNESS: 1
CONFUSION: 0
SEIZURES: 0
APPETITE CHANGE: 1
FEVER: 0
DYSPHORIC MOOD: 1
COLOR CHANGE: 0
TROUBLE SWALLOWING: 0
SLEEP DISTURBANCE: 1
DIARRHEA: 0
ABDOMINAL PAIN: 1
ARTHRALGIAS: 0
HALLUCINATIONS: 0
DECREASED CONCENTRATION: 1
EYE REDNESS: 0
NECK STIFFNESS: 0
VOMITING: 0
DYSURIA: 0
HEADACHES: 0
NUMBNESS: 0
BACK PAIN: 1
VOICE CHANGE: 0

## 2019-09-29 ASSESSMENT — ACTIVITIES OF DAILY LIVING (ADL)
RETIRED_COMMUNICATION: 0-->UNDERSTANDS/COMMUNICATES WITHOUT DIFFICULTY
TRANSFERRING: 0-->INDEPENDENT
FALL_HISTORY_WITHIN_LAST_SIX_MONTHS: NO
TOILETING: 0-->INDEPENDENT
DRESS: 0-->INDEPENDENT
BATHING: 0-->INDEPENDENT
RETIRED_EATING: 0-->INDEPENDENT
COGNITION: 0 - NO COGNITION ISSUES REPORTED
SWALLOWING: 0-->SWALLOWS FOODS/LIQUIDS WITHOUT DIFFICULTY
AMBULATION: 0-->INDEPENDENT
ADLS_ACUITY_SCORE: 11

## 2019-09-29 ASSESSMENT — MIFFLIN-ST. JEOR: SCORE: 1224.04

## 2019-09-29 ASSESSMENT — PAIN DESCRIPTION - DESCRIPTORS
DESCRIPTORS: ACHING;SHARP
DESCRIPTORS: ACHING;SHARP

## 2019-09-29 NOTE — ED PROVIDER NOTES
[facility]GENC[facility] DEPARTMENT ([facility])  9/29/19   History     Chief Complaint   Patient presents with     Abdominal Pain     The history is provided by the patient and medical records.     Hazel Fatima is a 63 year old female who has a PMHx of DM2, alcoholic cirrhosis c/b ascites, cholelithiasis s/p ERCP with sphincterotomy on 5/27 and recently diagnosed with biliary colic, who presents to the Emergency Department via EMS for evaluation of ongoing abdominal pain.  EMS placed an IV and gave patient 4 mg of Zofran en route. Here in the ED, Hazel reports constant upper abdominal pain that keeps her up at night. Most recent episode started 10:30pm last night but has been ongoing since last week. Patient states these episodes have been more frequent over time. Initially, had episodes month then weekly. Now is having episodes every few days. She went to [facility] last week during a bout of abdominal pain where she had an US that showed cholelithiasis. She states that she was advised in the past by Gastroenterology that if she needed her gallbladder removed the best place for her to do so was here at [facility] due to her liver disease. She is having some nausea with this abdominal pain. Sometimes abdominal pain is radiating to her right shoulder. No current fevers but did have low grade fevers (99.5) in [facility] on Tuesday (5 days ago). In terms of her liver, per review of medical record, labs have been stable with no change in her chronic LFT abnormalities.    Recent Imaging:  US Abd RUQ Organs 9/24/2019  COMPARISON:  09/12/2019.    IMPRESSION:   1. Cholelithiasis without sonographic evidence of cholecystitis.  2. Cirrhotic liver.  3.  Trace ascites.      I have reviewed the Medications, Allergies, Past Medical and Surgical History, and Social History in the [email or web address] system.    Past Medical History:   Diagnosis Date     Acute alcoholic hepatitis      Cirrhosis (H)      H/O migraine        Past  Surgical History:   Procedure Laterality Date     ABDOMEN SURGERY      C Section      GYN SURGERY      1993 and 1995 C Section        Family History   Problem Relation Age of Onset     Diabetes Type 2  Mother      Coronary Artery Disease Mother      Liver Cancer Maternal Grandfather      Diabetes Type 2  Father      Cerebrovascular Disease Father      Diabetes Type 2  Brother        Social History     Tobacco Use     Smoking status: Current Every Day Smoker     Packs/day: 0.30     Years: 12.00     Pack years: 3.60     Types: Cigarettes     Smokeless tobacco: Never Used   Substance Use Topics     Alcohol use: Not Currently     Frequency: Never     Comment: Quit Jan 2, 2019. Drank 7 drinks a week.        Current Facility-Administered Medications   Medication     acetaminophen (TYLENOL) tablet 650 mg     Daily 2 GRAM acetaminophen limit, unless fulminent liver failure or transaminases greater than or equal to 300 - 400, then none     [START ON 9/30/2019] furosemide (LASIX) tablet 20 mg     lactulose (CHRONULAC) solution 20 g    Or     lactulose (CHRONULAC) solution for enema prep 100 g     lactulose (CHRONULAC) solution 30 mL     lactulose (CHRONULAC) solution 30 mL     lidocaine (LMX4) cream     lidocaine (LMX4) cream     lidocaine 1 % 0.1-1 mL     lidocaine 1 % 0.1-1 mL     melatonin tablet 1 mg     naloxone (NARCAN) injection 0.1-0.4 mg     nicotine (COMMIT) lozenge 2 mg     [START ON 9/30/2019] nicotine (NICODERM CQ) 14 MG/24HR 24 hr patch 1 patch     nicotine Patch in Place     [START ON 9/30/2019] nicotine patch REMOVAL     [START ON 9/30/2019] omeprazole (priLOSEC) CR capsule 20 mg     ondansetron (ZOFRAN) injection 4 mg     ondansetron (ZOFRAN-ODT) ODT tab 4 mg     oxyCODONE (ROXICODONE) tablet 5 mg     [START ON 9/30/2019] piperacillin-tazobactam (ZOSYN) 3.375 g vial to attach to  mL bag     [START ON 9/30/2019] potassium chloride ER (K-DUR/KLOR-CON M) CR tablet 10 mEq     rifaximin (XIFAXAN) tablet 550  "mg     senna-docusate (SENOKOT-S/PERICOLACE) 8.6-50 MG per tablet 1 tablet    Or     senna-docusate (SENOKOT-S/PERICOLACE) 8.6-50 MG per tablet 2 tablet     sodium chloride (PF) 0.9% PF flush 3 mL     sodium chloride (PF) 0.9% PF flush 3 mL     sodium chloride (PF) 0.9% PF flush 3 mL     sodium chloride (PF) 0.9% PF flush 3 mL     sodium chloride 0.9% infusion     [START ON 9/30/2019] spironolactone (ALDACTONE) tablet 50 mg      No Known Allergies     Review of Systems   Constitutional: Positive for activity change, appetite change and fatigue. Negative for fever.   HENT: Negative for congestion, trouble swallowing and voice change.    Eyes: Negative for redness and visual disturbance.   Respiratory: Negative for shortness of breath.    Cardiovascular: Negative for chest pain and leg swelling.   Gastrointestinal: Positive for abdominal pain (upper abd pain radiating to right shoulder) and nausea. Negative for blood in stool, diarrhea and vomiting.   Genitourinary: Negative for difficulty urinating and dysuria.   Musculoskeletal: Positive for back pain. Negative for arthralgias and neck stiffness.        Some right shoulder pain referral also   Skin: Negative for color change, rash and wound.   Allergic/Immunologic: Negative for immunocompromised state.   Neurological: Positive for weakness. Negative for seizures, syncope, numbness and headaches.   Psychiatric/Behavioral: Positive for decreased concentration, dysphoric mood and sleep disturbance (due to pain all night). Negative for confusion and hallucinations.   All other systems reviewed and are negative.      Physical Exam   BP: 127/64  Pulse: 73  Heart Rate: 74  Temp: 98.4  F (36.9  C)  Resp: 19  Height: 167.6 cm (5' 6\")  Weight: 64.2 kg (141 lb 9.6 oz)  SpO2: 100 %      Physical Exam  Vitals signs and nursing note reviewed.   Constitutional:       General: She is in acute distress.      Appearance: She is well-developed. She is not toxic-appearing or " diaphoretic.      Comments: Patient is alert and oriented uncomfortable here in the ER.   HENT:      Head: Normocephalic and atraumatic.      Mouth/Throat:      Mouth: Mucous membranes are moist.   Eyes:      General: No scleral icterus.     Extraocular Movements: Extraocular movements intact.      Pupils: Pupils are equal, round, and reactive to light.      Comments: No appreciable scleral icterus seen at this point.   Neck:      Musculoskeletal: Normal range of motion and neck supple.   Cardiovascular:      Rate and Rhythm: Normal rate and regular rhythm.      Heart sounds: Normal heart sounds.   Pulmonary:      Effort: Pulmonary effort is normal.      Breath sounds: Normal breath sounds.   Abdominal:      General: Abdomen is flat. Bowel sounds are normal. There is no distension.      Palpations: Abdomen is soft.      Tenderness: There is tenderness in the right upper quadrant. There is guarding. There is no rebound. Positive signs include Zapata's sign.      Hernia: No hernia is present.   Musculoskeletal:         General: No swelling.   Skin:     General: Skin is warm and dry.      Capillary Refill: Capillary refill takes less than 2 seconds.      Coloration: Skin is not pale.      Findings: No erythema or rash.   Neurological:      Mental Status: She is alert and oriented to person, place, and time.   Psychiatric:      Comments: Flat affect noted.         ED Course   9:45 AM  The patient was seen and examined by Dajuan Boston MD in Room ED32.       Procedures         Patient evaluated here in the ER.  IV established labs drawn Dilaudid for pain control Zofran for nausea.  Patient also did receive a dose of Protonix IV.  In the ER labs reviewed right upper quadrant ultrasound was done records reviewed in [facility] with recent hospitalizations at [facility] and ER evaluation for recurrent biliary colic and referral to the [place] as she is followed in the GI clinic.  Patient symptoms did improve with her pain control  etc. here in the ER.  IV fluids given a liter normal saline.  Labs reviewed white count 11.5.  Total bilirubin is 2.4 but has been 2.8 in the past.  Patient's alk phos ALT and AST within normal limits.  Lipase 150.  Patient here in the ER had a right upper quadrant ultrasound with findings did show that of gallbladder hydrops with some mild wall thickening slight flow in the gallbladder wall also there are gallstones noted.  Common bile duct is 4 mm.  Surgery was consult evaluated patient feels this point with patient's cirrhosis she is high risk for morbidity mortality    No Operative procedures at this point per surgery consult as they do not feel she has acute cholecystitis.Increase risk with surgery.  She is having recurrent biliary colic recommendations for medicine admission consider GI referral I talked to GI also they did agree patient has sphincterotomy I think in May of this year also.  She may be a candidate for some extensive stenting etc. that may relieve some of her symptoms I think about observing her under medicine service would be appropriate also to make sure she does not spike a fever or have worsening pain etc.  Medicine agree with admit. Zosyn IV given.  Patient agrees with plan.       EKG Interpretation:      Interpreted by Dajuan Boston MD  Time reviewed: 9:52 AM  Symptoms at time of EKG: abdominal pain   Rhythm: normal sinus   Rate: normal  Axis: normal  Ectopy: none  Conduction: normal  ST Segments/ T Waves: No ST-T wave changes  Q Waves: none  Comparison to prior: No old EKG available    Clinical Impression: normal sinus rhythm without hyperacute changes          Critical Care time:  none             Labs Ordered and Resulted from Time of ED Arrival Up to the Time of Departure from the ED   INR - Abnormal; Notable for the following components:       Result Value    INR 1.33 (*)     All other components within normal limits   COMPREHENSIVE METABOLIC PANEL - Abnormal; Notable for the following  components:    Glucose 127 (*)     Bilirubin Total 2.4 (*)     Albumin 2.2 (*)     Protein Total 6.1 (*)     All other components within normal limits   CBC WITH PLATELETS DIFFERENTIAL - Abnormal; Notable for the following components:    WBC 11.5 (*)     RDW 15.4 (*)     Absolute Monocytes 2.0 (*)     All other components within normal limits   ISTAT  GASES LACTATE TROY POCT - Abnormal; Notable for the following components:    Ph Venous 7.47 (*)     PCO2 Venous 39 (*)     All other components within normal limits   CBC WITH PLATELETS DIFFERENTIAL   PARTIAL THROMBOPLASTIN TIME   LIPASE   TROPONIN I   CARDIAC CONTINUOUS MONITORING   PULSE OXIMETRY NURSING   PERIPHERAL IV CATHETER   ISTAT CG4 GASES LACTATE TROY NURSING POCT   ABO/RH TYPE AND SCREEN   BLOOD CULTURE           Results for orders placed or performed during the hospital encounter of 09/29/19   US Abdomen Limited    Narrative     EXAMINATION: US ABDOMEN LIMITED  9/29/2019 10:57 AM      CLINICAL HISTORY: abdominal pain ruq, hx of gallstone eval for  cholecystitis    COMPARISON: MRI 9/13/2019, ultrasound 9/12/2019        FINDINGS:  Nodular liver surface. The hepatic parenchyma demonstrates a coarsened  hepatic echotexture. There is no intrahepatic or extrahepatic biliary  ductal dilatation.     The common bile duct measures 4 mm.     Hydropic gallbladder with layering biliary sludge/stones, including a  2.5 cm stone layering dependently. Gallbladder wall measures 4 mm in  thickness and there is trace flow in the gallbladder wall seen on  image 28.     Prominent varices inferior to the right liver. Patent main portal vein  with normal directional flow.    The pancreas is not well visualized.    The visualized upper abdominal aorta and inferior vena cava are  normal.      The right kidney measures 10.4 cm. There is no significant urinary  tract dilation.    Trace ascites.      Impression    IMPRESSION:  1.  Hydropic gallbladder with cholelithiasis, mild wall  thickening,  and trace flow in the gallbladder wall. Consider nuclear medicine  hepatobiliary scan to evaluate for acute cholecystitis.  2.  Cirrhotic configuration of the liver with prominent varices in the  right mid abdomen beneath the liver. Trace ascites.    I have personally reviewed the examination and initial interpretation  and I agree with the findings.    BRIDGET BURRELL MD   CBC with platelets differential   Result Value Ref Range    WBC Canceled, Test credited 4.0 - 11.0 10e9/L    RBC Count Canceled, Test credited 3.8 - 5.2 10e12/L    Hemoglobin Canceled, Test credited 11.7 - 15.7 g/dL    Hematocrit Canceled, Test credited 35.0 - 47.0 %    MCV Canceled, Test credited 78 - 100 fl    MCH Canceled, Test credited 26.5 - 33.0 pg    MCHC Canceled, Test credited 31.5 - 36.5 g/dL    RDW Canceled, Test credited 10.0 - 15.0 %    Platelet Count Canceled, Test credited 150 - 450 10e9/L    Diff Method Canceled, Test credited    INR   Result Value Ref Range    INR 1.33 (H) 0.86 - 1.14   Partial thromboplastin time   Result Value Ref Range    PTT 31 22 - 37 sec   Comprehensive metabolic panel   Result Value Ref Range    Sodium 135 133 - 144 mmol/L    Potassium 4.0 3.4 - 5.3 mmol/L    Chloride 101 94 - 109 mmol/L    Carbon Dioxide 25 20 - 32 mmol/L    Anion Gap 9 3 - 14 mmol/L    Glucose 127 (H) 70 - 99 mg/dL    Urea Nitrogen 9 7 - 30 mg/dL    Creatinine 0.68 0.52 - 1.04 mg/dL    GFR Estimate >90 >60 mL/min/[1.73_m2]    GFR Estimate If Black >90 >60 mL/min/[1.73_m2]    Calcium 9.0 8.5 - 10.1 mg/dL    Bilirubin Total 2.4 (H) 0.2 - 1.3 mg/dL    Albumin 2.2 (L) 3.4 - 5.0 g/dL    Protein Total 6.1 (L) 6.8 - 8.8 g/dL    Alkaline Phosphatase 112 40 - 150 U/L    ALT 14 0 - 50 U/L    AST 26 0 - 45 U/L   Lipase   Result Value Ref Range    Lipase 150 73 - 393 U/L   Troponin I   Result Value Ref Range    Troponin I ES <0.015 0.000 - 0.045 ug/L   CBC with platelets differential   Result Value Ref Range    WBC 11.5 (H) 4.0 - 11.0  10e9/L    RBC Count 3.83 3.8 - 5.2 10e12/L    Hemoglobin 12.2 11.7 - 15.7 g/dL    Hematocrit 36.2 35.0 - 47.0 %    MCV 95 78 - 100 fl    MCH 31.9 26.5 - 33.0 pg    MCHC 33.7 31.5 - 36.5 g/dL    RDW 15.4 (H) 10.0 - 15.0 %    Platelet Count 180 150 - 450 10e9/L    Diff Method Automated Method     % Neutrophils 67.3 %    % Lymphocytes 13.8 %    % Monocytes 17.1 %    % Eosinophils 0.9 %    % Basophils 0.3 %    % Immature Granulocytes 0.6 %    Nucleated RBCs 0 0 /100    Absolute Neutrophil 7.8 1.6 - 8.3 10e9/L    Absolute Lymphocytes 1.6 0.8 - 5.3 10e9/L    Absolute Monocytes 2.0 (H) 0.0 - 1.3 10e9/L    Absolute Eosinophils 0.1 0.0 - 0.7 10e9/L    Absolute Basophils 0.0 0.0 - 0.2 10e9/L    Abs Immature Granulocytes 0.1 0 - 0.4 10e9/L    Absolute Nucleated RBC 0.0    EKG 12 lead   Result Value Ref Range    Interpretation ECG Click View Image link to view waveform and result    ISTAT gases lactate jarret POCT   Result Value Ref Range    Ph Venous 7.47 (H) 7.32 - 7.43 pH    PCO2 Venous 39 (L) 40 - 50 mm Hg    PO2 Venous 26 25 - 47 mm Hg    Bicarbonate Venous 28 21 - 28 mmol/L    O2 Sat Venous 53 %    Lactic Acid 1.4 0.7 - 2.1 mmol/L   ABO/Rh type and screen   Result Value Ref Range    ABO O     RH(D) Pos     Antibody Screen Neg     Test Valid Only At          [facility]    Specimen Expires 10/02/2019    Blood Culture ONE site   Result Value Ref Range    Specimen Description Blood Left Arm     Special Requests Aerobic and anaerobic bottles received     Culture Micro PENDING          Assessments & Plan (with Medical Decision Making)  64 yo female with hx of alcoholic cirrhosis with increasing frequency of recurrent biliary colic. No fever. US with hydops gallbladder with stones mild thickening. Surgery consult feel no surgery now with increase risk with cirrhosis and no sign of acute cholecystitis. GI feels possible stent placment. Admit to medicine with Zosyn given and monitor sx.            I have reviewed the nursing notes.    I have reviewed the findings, diagnosis, plan and need for follow up with the patient.    Current Discharge Medication List          Final diagnoses:   Recurrent biliary colic   Calculus of gallbladder without cholecystitis without obstruction   Alcoholic cirrhosis of liver with ascites (H)     I, Venkat Amin, am serving as a trained medical scribe to document services personally performed by Dajuan Boston MD, based on the provider's statements to me.   Ludy MONROE MD, was physically present and have reviewed and verified the accuracy of this note documented by Venkat Amin.     9/29/2019   [facility] EMERGENCY DEPARTMENT    This note was created at least in part by the use of dragon voice dictation system. Inadvertent typographical errors may still exist.  Dajuan Boston MD.         Dajuan Boston MD  09/29/19 1824

## 2019-09-29 NOTE — ED TRIAGE NOTES
Hazel FERGUSON for ongoing abd pain; pt c/o nausea and pain that kept her up all night. EMS placed IV and gave Zofran 4 mg. Pt has consult tomorrow for gallstones.

## 2019-09-29 NOTE — CONSULTS
"    General Surgery Consultation    Hazel Fatima  MRN#: [id number]    Date of Admission:  9/29/2019    Date of Consult: 9/29/2019    Reason for consult: Chronic RUQ pain       Requesting service: Emergency Dept       Requesting provider: Dr. Boston                   Assessment and Plan:   Assessment:   Hazel Fatima is a 63 year old female who has a PMHx of DM2, alcoholic cirrhosis c/b ascites, cholelithiasis s/p ERCP with sphincterotomy on 5/27 and recently diagnosed with biliary colic who presents with ongoing RUQ abdominal pain. She has had ongoing biliary colic for the last 4 months. MELD is 15 and Child-Perez Class B today.         Plan:   - No surgical intervention recommended. Given her liver disease, patient is high risk candidate and benefit does not outweigh the risk for an elective operation for her biliary colic.  - Recommend GI evaluation for consideration of less invasive interventions  - No surgery follow up or referrals recommended at this time    Discussed with Staff, Dr. Arriaga.    Lalitha Stokes,   General Surgery, PGY2  [place] Pager: [phone]              Chief Complaint:   RUQ Pain         History of Present Illness:   Hazel Fatima is a 63 year old female who has a PMHx of DM2, alcoholic cirrhosis c/b ascites, cholelithiasis s/p ERCP with sphincterotomy on 5/27 and recently diagnosed with biliary colic who presents with ongoing RUQ abdominal pain.    She reports acute onset of epigastric/RUQ pain radiating around her body \"like a band\". Severe burning pain 10/10. Along with intermittent stabbing RUQ pain. Reports the band-like severe pain initially starts like a feeling of acid indigestion but doesn't go away. Pain is unrelated to meals. Nothing seems to make it better. She endorses sweats a few days ago and chills last night. No documented fevers. When her pain comes she also develops shortness of breath, right shoulder pain, and left sided chest pain \"like she's having a " "heart attack\". She reports constipation for the last 2 days, typically has 3 BMs a day with her lactulose. ROS otherwise negative.    Ms. Fatima first developed RUQ pain in 5/14/2019, since then she has been seen in the hospital 6 times for similar pain issues and diagnosed with biliary colic. Reports her symptoms are happening more frequently initially occurring monthly, then every few weeks, and now most recent episode 5 days ago at [facility].    Of note she underwent an ERCP with sphincterotomy on 5/27. Last paracentesis was approximately 2 months ago. Was evaluated for consideration of liver transplant in 6/2019 but advised to return if her MELD increases because she would be too far down the transplant list. In the last month MELD 17 with Child-Perez Class C, today her MELD is 15 and Child-Perez Class B. She has previously been informed of her high risk surgery given her liver disease. . She states that she was advised in the past by Gastroenterology that if she needed her gallbladder removed the best place for her to do so was here at [facility] due to her liver disease.     ED workup with mild leukocytosis stable/improved from 5 days ago. Elevated Tbili (stable/downtrending from previous). LFTs wnl. Mild elevated INR. Ultrasound with hydropic gallbaldder, biliary sludge and stones, mild thickened gallbladder wall, and trace ascites.         Past Medical History:     Past Medical History:   Diagnosis Date     Acute alcoholic hepatitis      Cirrhosis (H)      H/O migraine              Past Surgical History:     Past Surgical History:   Procedure Laterality Date     ABDOMEN SURGERY      C Section      GYN SURGERY      1993 and 1995 C Section              Social History:   Tobacco: 10 cigarrettes/day  EtOH: Quit 1/2019      Social History     Tobacco Use     Smoking status: Current Every Day Smoker     Packs/day: 0.30     Years: 12.00     Pack years: 3.60     Types: Cigarettes     Smokeless tobacco: Never Used "   Substance Use Topics     Alcohol use: Not Currently     Frequency: Never     Comment: Quit Jan 2, 2019. Drank 7 drinks a week.              Family History:     Family History   Problem Relation Age of Onset     Diabetes Type 2  Mother      Coronary Artery Disease Mother      Liver Cancer Maternal Grandfather      Diabetes Type 2  Father      Cerebrovascular Disease Father      Diabetes Type 2  Brother                 Allergies:   No Known Allergies          Medications:     No current facility-administered medications on file prior to encounter.   furosemide (LASIX) 20 MG tablet, TAKE 3 TABLETS BY MOUTH EVERY DAY  lactulose (CHRONULAC) 10 GM/15ML solution, Take 30 mLs by mouth  omeprazole (PRILOSEC) 20 MG DR capsule, Take 20 mg by mouth  potassium chloride ER (MICRO-K) 10 MEQ CR capsule, Take 10 mEq by mouth  spironolactone (ALDACTONE) 50 MG tablet, TAKE 2 TABLETS BY MOUTH EVERY DAY              Review of Systems:   10-point ROS otherwise negative except as noted above.          Physical Exam:     Temp:  [98.4  F (36.9  C)] 98.4  F (36.9  C)  Pulse:  [73] 73  Heart Rate:  [72-77] 72  Resp:  [11-19] 11  BP: (107-127)/(62-66) 110/62  SpO2:  [95 %-100 %] 95 %     General: AAOx4, NAD, lying comfortably in bed  CV: regular rate and rhythm, warm, well-perfused  Pulm: no dyspnea, breathing comfortably on RA  Abd: soft, nondistended, epigastric tender, LUQ tender, RUQ tender (+jaimes's), no rebound, no guarding, no appreciable fluid wave  Extremities: no edema  Neuro: moving all extremities spontaneously without apparent deficit    No intake/output data recorded.          Data:   Labs:  Arterial Blood Gases   No lab results found in last 7 days.     Complete Blood Count   Recent Labs   Lab 09/29/19  1048 09/29/19  1006   WBC 11.5* Canceled, Test credited   HGB 12.2 Canceled, Test credited    Canceled, Test credited       Basic Metabolic Panel  Recent Labs   Lab 09/29/19  1006      POTASSIUM 4.0   CHLORIDE 101    CO2 25   BUN 9   CR 0.68   *   TAI 9.0       Liver Function Tests  Recent Labs   Lab 09/29/19  1006   AST 26   ALT 14   ALKPHOS 112   BILITOTAL 2.4*   ALBUMIN 2.2*       Pancreatic Enzymes  Recent Labs   Lab 09/29/19  1006   LIPASE 150       Coagulation Profile  Recent Labs   Lab 09/29/19  1006   INR 1.33*   PTT 31       Lactate  Recent Labs   Lab 09/29/19  1005   LACT 1.4       Imaging:   Results for orders placed or performed during the hospital encounter of 09/29/19   US Abdomen Limited    Narrative     EXAMINATION: US ABDOMEN LIMITED  9/29/2019 10:57 AM      CLINICAL HISTORY: abdominal pain ruq, hx of gallstone eval for  cholecystitis    COMPARISON: MRI 9/13/2019, ultrasound 9/12/2019        FINDINGS:  Nodular liver surface. The hepatic parenchyma demonstrates a coarsened  hepatic echotexture. There is no intrahepatic or extrahepatic biliary  ductal dilatation.     The common bile duct measures 4 mm.     Hydropic gallbladder with layering biliary sludge/stones, including a  2.5 cm stone layering dependently. Gallbladder wall measures 4 mm in  thickness and there is trace flow in the gallbladder wall seen on  image 28.     Prominent varices inferior to the right liver. Patent main portal vein  with normal directional flow.    The pancreas is not well visualized.    The visualized upper abdominal aorta and inferior vena cava are  normal.      The right kidney measures 10.4 cm. There is no significant urinary  tract dilation.    Trace ascites.      Impression    IMPRESSION:  1.  Hydropic gallbladder with cholelithiasis, mild wall thickening,  and trace flow in the gallbladder wall. Consider nuclear medicine  hepatobiliary scan to evaluate for acute cholecystitis.  2.  Cirrhotic configuration of the liver with prominent varices in the  right mid abdomen beneath the liver. Trace ascites.    I have personally reviewed the examination and initial interpretation  and I agree with the findings.    BRIDGET VILLA  MD ASHANTI

## 2019-09-30 ENCOUNTER — ANESTHESIA (OUTPATIENT)
Dept: MEDSURG UNIT | Facility: CLINIC | Age: 64
DRG: 445 | End: 2019-09-30
Payer: COMMERCIAL

## 2019-09-30 ENCOUNTER — ANESTHESIA EVENT (OUTPATIENT)
Dept: MEDSURG UNIT | Facility: CLINIC | Age: 64
DRG: 445 | End: 2019-09-30
Payer: COMMERCIAL

## 2019-09-30 LAB
ALBUMIN SERPL-MCNC: 1.9 G/DL (ref 3.4–5)
ALP SERPL-CCNC: 82 U/L (ref 40–150)
ALT SERPL W P-5'-P-CCNC: 16 U/L (ref 0–50)
ANION GAP SERPL CALCULATED.3IONS-SCNC: 9 MMOL/L (ref 3–14)
AST SERPL W P-5'-P-CCNC: 23 U/L (ref 0–45)
BASOPHILS # BLD AUTO: 0 10E9/L (ref 0–0.2)
BASOPHILS NFR BLD AUTO: 0.2 %
BILIRUB SERPL-MCNC: 2.6 MG/DL (ref 0.2–1.3)
BUN SERPL-MCNC: 9 MG/DL (ref 7–30)
CALCIUM SERPL-MCNC: 8.4 MG/DL (ref 8.5–10.1)
CHLORIDE SERPL-SCNC: 107 MMOL/L (ref 94–109)
CO2 SERPL-SCNC: 22 MMOL/L (ref 20–32)
CREAT SERPL-MCNC: 0.71 MG/DL (ref 0.52–1.04)
DIFFERENTIAL METHOD BLD: ABNORMAL
EOSINOPHIL # BLD AUTO: 0.2 10E9/L (ref 0–0.7)
EOSINOPHIL NFR BLD AUTO: 1.3 %
ERYTHROCYTE [DISTWIDTH] IN BLOOD BY AUTOMATED COUNT: 15.5 % (ref 10–15)
GFR SERPL CREATININE-BSD FRML MDRD: >90 ML/MIN/{1.73_M2}
GLUCOSE BLDC GLUCOMTR-MCNC: 102 MG/DL (ref 70–99)
GLUCOSE SERPL-MCNC: 94 MG/DL (ref 70–99)
HCT VFR BLD AUTO: 34 % (ref 35–47)
HGB BLD-MCNC: 11.1 G/DL (ref 11.7–15.7)
IMM GRANULOCYTES # BLD: 0.1 10E9/L (ref 0–0.4)
IMM GRANULOCYTES NFR BLD: 0.6 %
INR PPP: 1.36 (ref 0.86–1.14)
LYMPHOCYTES # BLD AUTO: 1.9 10E9/L (ref 0.8–5.3)
LYMPHOCYTES NFR BLD AUTO: 13.9 %
MCH RBC QN AUTO: 31.9 PG (ref 26.5–33)
MCHC RBC AUTO-ENTMCNC: 32.6 G/DL (ref 31.5–36.5)
MCV RBC AUTO: 98 FL (ref 78–100)
MONOCYTES # BLD AUTO: 2.5 10E9/L (ref 0–1.3)
MONOCYTES NFR BLD AUTO: 18.8 %
NEUTROPHILS # BLD AUTO: 8.7 10E9/L (ref 1.6–8.3)
NEUTROPHILS NFR BLD AUTO: 65.2 %
NRBC # BLD AUTO: 0 10*3/UL
NRBC BLD AUTO-RTO: 0 /100
PLATELET # BLD AUTO: 163 10E9/L (ref 150–450)
POTASSIUM SERPL-SCNC: 3.8 MMOL/L (ref 3.4–5.3)
PROT SERPL-MCNC: 5.3 G/DL (ref 6.8–8.8)
RBC # BLD AUTO: 3.48 10E12/L (ref 3.8–5.2)
SODIUM SERPL-SCNC: 138 MMOL/L (ref 133–144)
WBC # BLD AUTO: 13.3 10E9/L (ref 4–11)

## 2019-09-30 PROCEDURE — 25000128 H RX IP 250 OP 636: Performed by: FAMILY MEDICINE

## 2019-09-30 PROCEDURE — 99232 SBSQ HOSP IP/OBS MODERATE 35: CPT | Mod: GC | Performed by: PEDIATRICS

## 2019-09-30 PROCEDURE — 36415 COLL VENOUS BLD VENIPUNCTURE: CPT | Performed by: STUDENT IN AN ORGANIZED HEALTH CARE EDUCATION/TRAINING PROGRAM

## 2019-09-30 PROCEDURE — 40000893 ZZH STATISTIC PT IP EVAL DEFER

## 2019-09-30 PROCEDURE — 25800030 ZZH RX IP 258 OP 636: Performed by: FAMILY MEDICINE

## 2019-09-30 PROCEDURE — 12000001 ZZH R&B MED SURG/OB UMMC

## 2019-09-30 PROCEDURE — 25000132 ZZH RX MED GY IP 250 OP 250 PS 637: Performed by: STUDENT IN AN ORGANIZED HEALTH CARE EDUCATION/TRAINING PROGRAM

## 2019-09-30 PROCEDURE — 85610 PROTHROMBIN TIME: CPT | Performed by: STUDENT IN AN ORGANIZED HEALTH CARE EDUCATION/TRAINING PROGRAM

## 2019-09-30 PROCEDURE — 80053 COMPREHEN METABOLIC PANEL: CPT | Performed by: STUDENT IN AN ORGANIZED HEALTH CARE EDUCATION/TRAINING PROGRAM

## 2019-09-30 PROCEDURE — 00000146 ZZHCL STATISTIC GLUCOSE BY METER IP

## 2019-09-30 PROCEDURE — 85025 COMPLETE CBC W/AUTO DIFF WBC: CPT | Performed by: STUDENT IN AN ORGANIZED HEALTH CARE EDUCATION/TRAINING PROGRAM

## 2019-09-30 PROCEDURE — 25000132 ZZH RX MED GY IP 250 OP 250 PS 637: Performed by: PEDIATRICS

## 2019-09-30 PROCEDURE — 25000128 H RX IP 250 OP 636: Performed by: STUDENT IN AN ORGANIZED HEALTH CARE EDUCATION/TRAINING PROGRAM

## 2019-09-30 PROCEDURE — 25000131 ZZH RX MED GY IP 250 OP 636 PS 637: Performed by: STUDENT IN AN ORGANIZED HEALTH CARE EDUCATION/TRAINING PROGRAM

## 2019-09-30 RX ORDER — ACETAMINOPHEN 325 MG/1
325 TABLET ORAL EVERY 6 HOURS PRN
COMMUNITY

## 2019-09-30 RX ORDER — FUROSEMIDE 20 MG
TABLET ORAL
Status: ON HOLD | COMMUNITY
End: 2019-10-03

## 2019-09-30 RX ADMIN — POTASSIUM CHLORIDE 10 MEQ: 750 TABLET, EXTENDED RELEASE ORAL at 08:10

## 2019-09-30 RX ADMIN — PIPERACILLIN SODIUM AND TAZOBACTAM SODIUM 3.38 G: 3; .375 INJECTION, POWDER, LYOPHILIZED, FOR SOLUTION INTRAVENOUS at 00:58

## 2019-09-30 RX ADMIN — PIPERACILLIN SODIUM AND TAZOBACTAM SODIUM 3.38 G: 3; .375 INJECTION, POWDER, LYOPHILIZED, FOR SOLUTION INTRAVENOUS at 18:58

## 2019-09-30 RX ADMIN — RIFAXIMIN 550 MG: 550 TABLET ORAL at 08:10

## 2019-09-30 RX ADMIN — OXYCODONE HYDROCHLORIDE 5 MG: 5 TABLET ORAL at 12:39

## 2019-09-30 RX ADMIN — LACTULOSE 30 ML: 20 SOLUTION ORAL at 20:36

## 2019-09-30 RX ADMIN — PIPERACILLIN SODIUM AND TAZOBACTAM SODIUM 3.38 G: 3; .375 INJECTION, POWDER, LYOPHILIZED, FOR SOLUTION INTRAVENOUS at 12:30

## 2019-09-30 RX ADMIN — FUROSEMIDE 20 MG: 20 TABLET ORAL at 08:10

## 2019-09-30 RX ADMIN — PIPERACILLIN SODIUM AND TAZOBACTAM SODIUM 3.38 G: 3; .375 INJECTION, POWDER, LYOPHILIZED, FOR SOLUTION INTRAVENOUS at 06:33

## 2019-09-30 RX ADMIN — LACTULOSE 30 ML: 20 SOLUTION ORAL at 08:10

## 2019-09-30 RX ADMIN — OXYCODONE HYDROCHLORIDE 5 MG: 5 TABLET ORAL at 18:58

## 2019-09-30 RX ADMIN — LACTULOSE 30 ML: 20 SOLUTION ORAL at 14:28

## 2019-09-30 RX ADMIN — FUROSEMIDE 20 MG: 20 TABLET ORAL at 12:30

## 2019-09-30 RX ADMIN — ONDANSETRON 4 MG: 4 TABLET, ORALLY DISINTEGRATING ORAL at 05:33

## 2019-09-30 RX ADMIN — FUROSEMIDE 20 MG: 20 TABLET ORAL at 17:53

## 2019-09-30 RX ADMIN — OXYCODONE HYDROCHLORIDE 5 MG: 5 TABLET ORAL at 06:30

## 2019-09-30 RX ADMIN — RIFAXIMIN 550 MG: 550 TABLET ORAL at 20:36

## 2019-09-30 RX ADMIN — SODIUM CHLORIDE 1000 ML: 9 INJECTION, SOLUTION INTRAVENOUS at 05:35

## 2019-09-30 RX ADMIN — SPIRONOLACTONE 50 MG: 25 TABLET ORAL at 08:10

## 2019-09-30 RX ADMIN — ONDANSETRON 4 MG: 2 INJECTION INTRAMUSCULAR; INTRAVENOUS at 23:10

## 2019-09-30 RX ADMIN — SODIUM CHLORIDE 1000 ML: 9 INJECTION, SOLUTION INTRAVENOUS at 15:09

## 2019-09-30 RX ADMIN — OXYCODONE HYDROCHLORIDE 5 MG: 5 TABLET ORAL at 23:10

## 2019-09-30 RX ADMIN — SPIRONOLACTONE 50 MG: 25 TABLET ORAL at 16:02

## 2019-09-30 RX ADMIN — OMEPRAZOLE 20 MG: 20 CAPSULE, DELAYED RELEASE ORAL at 08:10

## 2019-09-30 RX ADMIN — OXYCODONE HYDROCHLORIDE 5 MG: 5 TABLET ORAL at 02:02

## 2019-09-30 ASSESSMENT — ACTIVITIES OF DAILY LIVING (ADL)
ADLS_ACUITY_SCORE: 11

## 2019-09-30 ASSESSMENT — PAIN DESCRIPTION - DESCRIPTORS
DESCRIPTORS: ACHING

## 2019-09-30 NOTE — PLAN OF CARE
A&O. [place] score of 0. VSS. Pain controlled with prn oxycodone. Frequently voiding. Passing gas, no BM overnight. L PIV x1 infusing MIVF. NPO. Intermittently nauseous- less when pain is controlled. Zofran given x1. SBA. Continue with plan of care.

## 2019-09-30 NOTE — PLAN OF CARE
7C  Discharge Planner PT   Patient plan for discharge: home  Current status: pt up IND in room, completes all mobility without physical assistance. Denies concern or need for IP PT or OT following description of services. Ambulates x 200', steady on feet. PT to complete orders and sign off.   Barriers to return to prior living situation: none per mobility  Recommendations for discharge: home  Rationale for recommendations: pt mobilizing well, denies concern with return to home.       Entered by: Byron Malik 09/30/2019 9:34 AM

## 2019-09-30 NOTE — PLAN OF CARE
Afebrile, VSS on RA.  Alert and oriented x4.  Oxycodone given for abdominal pain x1.  Denies nausea.  NPO.  NS infusing @ 125 ml/hr.  ERCP with transpapillary cystic duct stent to drain the gallbladder plan for today.  Up independently to bathroom.  Continue plan of care.

## 2019-09-30 NOTE — PLAN OF CARE
Discharge Planner OT   Patient plan for discharge: home with assist  Current status: cancel and DEFER OT eval, per chart review and discussion with physical therapy, pt is currently completing all ADLs and functional mobility with minimal difficulty, no cognition concerns noted. No acute OT needs identified, pt is at baseline level of function. Will complete orders, thank you for your referral.   Barriers to return to prior living situation: medical status   Recommendations for discharge: home with assist as needed  Rationale for recommendations: No IP therapy needs identified, pt will be able to safely d/c home once medically stable.        Entered by: Yaritza Lane 09/30/2019 9:27 AM

## 2019-09-30 NOTE — PROGRESS NOTES
[facility] Daily Progress Note    Hazel Fatima is a 63 year old female admitted on 9/29/2019. 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 Also no stools for past few days despite PTA lactulose.    Interval History/Subjective:  Feels improved today. Less abdominal pain. No CP, SOB, nausea. Had a small BM today. Discussed with nurse starting PRN q 2h lactulose.  - OR for ERCP w/ possible stent cancelled and rescheduled to 10/1    Objective    Physical Exam:   Temp:  [97.3  F (36.3  C)-98.7  F (37.1  C)] 98.1  F (36.7  C)  Pulse:  [70-78] 78  Heart Rate:  [70-81] 78  Resp:  [11-28] 16  BP: (102-127)/(52-66) 102/52  SpO2:  [92 %-100 %] 96 %    GENERAL: Appears alert and oriented times three.   Constitutional: Alert, oriented x4, no distress and cooperative  HEENT: Normocephalic, PERLL, EOMI, anicteric sclera  Cardiovascular: RRR. No edema or JVD.  Respiratory: Good diaphragmatic excursion. No wheezes  Gastrointestinal: Abdomen soft, RUQ tenderness (+jaimes), moderate distention. BS normal.   Musculoskeletal: Extremities normal with no gross deformities noted, normal muscle tone, peripheral pulses normal and normal ROM.   Skin: No suspicious lesions or rashes  Neurologic: Gait normal. Sensation grossly WNL. No asterixis  Psychiatric: Mentation appears normal and affect normal/bright    Labs  CMP  Recent Labs   Lab 09/30/19  0755 09/29/19  1006    135   POTASSIUM 3.8 4.0   CHLORIDE 107 101   CO2 22 25   ANIONGAP 9 9   GLC 94 127*   BUN 9 9   CR 0.71 0.68   GFRESTIMATED >90 >90   GFRESTBLACK >90 >90   TAI 8.4* 9.0   PROTTOTAL PENDING 6.1*   ALBUMIN PENDING 2.2*   BILITOTAL PENDING 2.4*   ALKPHOS PENDING 112   AST PENDING 26   ALT PENDING 14     CBC  Recent Labs   Lab 09/30/19  0755 09/29/19  1048 09/29/19  1006   WBC 13.3* 11.5* Canceled, Test  credited   RBC 3.48* 3.83 Canceled, Test credited   HGB 11.1* 12.2 Canceled, Test credited   HCT 34.0* 36.2 Canceled, Test credited   MCV 98 95 Canceled, Test credited   MCH 31.9 31.9 Canceled, Test credited   MCHC 32.6 33.7 Canceled, Test credited   RDW 15.5* 15.4* Canceled, Test credited    180 Canceled, Test credited     INR  Recent Labs   Lab 09/30/19  0755 09/29/19  1006   INR 1.36* 1.33*     Imaging  US RUQ 9/29: IMPRESSION:  1.  Hydropic gallbladder with cholelithiasis, mild wall thickening,  and trace flow in the gallbladder wall. Consider nuclear medicine  hepatobiliary scan to evaluate for acute cholecystitis.  2.  Cirrhotic configuration of the liver with prominent varices in the  right mid abdomen beneath the liver. Trace ascites.    Assessment and Plan:   Hazel Fatima is a 63 year old female admitted on 9/29/2019. 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      Abdominal Pain  Cholelithiasis  Patient with worsening RUQ pain radiating around abdomen and back. Seen in ED by surgery, risks outweighs benefits at this time for cholecystectomy. GI consulted for alternative interventions to relieve biliary sludge.   - GI consult, appreciate recs   -- OR for ERCP w/ possible stent cancelled and rescheduled to 10/1   -- NPO at [facility]   -- cont zozyn  - IV fluids  - oxycodone 5mg q4h PRN  - ondansetron 4mg q6h PRN     ESLD--MELD-Na 15 on admission  Constipation  Patient reports no bowel movements since Thursday. No AMS or asterixis on exam.   - PTA lactulose 30mL TID, with additional lactulose 30mL q2h PRN, titrate to 3-5 bowel movements per day  - Continue PTA spironolactone, furosemide, rifaximin  - Daily MELD labs  - GI consulted    Tobacco use  Discussed smoking cessation. Smokes 10 cigarettes per day.  - Nicotine replacement patches and lozenges PRN     Diet: NPO for Medical/Clinical Reasons Except for: Ice Chips,  Meds    Fluids: NS 100cc/hr     DVT Prophylaxis: Pneumatic Compression Devices  Murphy Catheter: not present  Code Status: FULL    Patient seen and discussed with Dr. Bethea.      Luzma Trimble   Medicine-dermatology PGY1

## 2019-09-30 NOTE — PLAN OF CARE
Patient arrived to  from ED 1850. AVSS on RA. A+OX4, HE score 0. Endorses b/l upper abdominal pain, given 5mg oxycodone X1. Intermittent nausea, not requiring intervention. NPO, ice chips. MIVF through PIV. Voiding frequently in small amounts, bladder scan 40mL, UA/UC sent. Passing gas, Last BM 9/26, given scheduled lactulose. Admission documents/PTA meds completed.     Plan: possible procedure with GI tomorrow, no pre-procedure ordered placed as of this time. Continue to monitor and continue POC.

## 2019-09-30 NOTE — PHARMACY-ADMISSION MEDICATION HISTORY
Admission medication history interview status for the 9/29/2019 admission is complete. See Epic admission navigator for allergy information, pharmacy, prior to admission medications and immunization status.     Medication history interview sources:  patient's discharge medication summary + patient.    Changes made to PTA medication list (reason)  Added: acetaminophen  Deleted: none  Changed:    Furosemide: take 20 mg TID -> take 40 mg in the morning and 20 mg in the PM (based on the patient's report)   Lactulose: take 30 mL once daily -> 30 mL three times daily (based on medication discharge summary)   Omeprazole: take 20 mg every day -> take 20 mg everyday, take 1 hour before a meal (based on medication discharge summary)   Potassium: take 10 mEq every day ->  10 mEq qPM (based on pt's report, she prefers to take it in the evening)   Spironolactone: take 100 mg every day -> take 50 mg BID (based on patient's report)     Additional medication history information (including reliability of information, actions taken by pharmacist): the obtained information is somewhat reliable.        Prior to Admission medications    Medication Sig Last Dose Taking? Auth Provider   acetaminophen (TYLENOL) 325 MG tablet Take 325 mg by mouth every 6 hours as needed for pain Past Week Yes Unknown, Entered By History   furosemide (LASIX) 20 MG tablet Take 20 mg by mouth 3 times daily (Take 40 mg in the morning and 20 mg in the evening) 9/28/2019 at AM Yes Unknown, Entered By History   lactulose (CHRONULAC) 10 GM/15ML solution Take 30 mLs by mouth 3 times daily  9/27/2019 Yes Reported, Patient   omeprazole (PRILOSEC) 20 MG DR capsule Take 20 mg by mouth daily . Take 1 hour before a meal. 9/28/2019 at 0800 Yes Reported, Patient   ondansetron (ZOFRAN-ODT) 4 MG ODT tab Take 4 mg by mouth every 6 hours as needed for nausea or vomiting  9/28/2019 Yes Reported, Patient   potassium chloride ER (MICRO-K) 10 MEQ CR capsule Take 10 mEq by mouth  every evening  9/27/2019 at PM Yes Reported, Patient   spironolactone (ALDACTONE) 50 MG tablet Take 50 mg by mouth 2 times daily  9/28/2019 at 2100 Yes Reported, Patient         Medication history completed by:     Thank you,     Lali Stovall, 4th year Pharmacy Student.

## 2019-09-30 NOTE — OR NURSING
Pt's case cancelled and rescheduled for tomorrow AM.  Nursing staff spent a total of 50 minutes of face to face time with pt.  Pt transported back to Unit 7C via liter.

## 2019-09-30 NOTE — CONSULTS
GASTROENTEROLOGY CONSULTATION      Date of Admission:  9/29/2019          ASSESSMENT AND RECOMMENDATIONS:   Assessment:  63 year old female with a history of alcoholic cirrhosis c/b ascites, choledocholithiasis s/p ERCP 5/2019 with sphincterotomy and biliary swept and history of biliary colic with multiple ED visits. Now presents with RUQ pain and was found to have elevated WBC count of 11.5 and US showing hydropic gallbladder with cholelithiasis, mild wall thickening and trace pericholecystic fluid concern for acute cholecystitis.    #Possible acute cholecystitis  History of biliary colic with multiple ED visits. Current pain more consistent with cholecystitis with elevated WBC count and imaging suggesting GB wall thickening with trace pericholecystic fluid. Surgery consulted and recommended no surgical intervention given her liver disease. Current option that we will recommend is a transpapillary cystic stent to drain the gallbladder. Other options such as a perc-drain or a transduodenal axios stent are other options although could preclude her for liver transplant in the future.   --Continue NPO   --Continue antibiotics with Zosyn   --ERCP with transpapillary cystic duct stent to drain the gallbladder plan for today   --GI will continue to follow       #Alcoholic cirrhosis c/b ascites, HE  On lactulose for HE, trace ascites on US, no EV varices on last EGD (5/6/19) although portal hypertensive gastropathy. No HCC on prior imaging. Varices on prior imaging noted in the upper abdomen. MELD-Na 16. No signs of bleeding. Sober since 01/02/19  --Continue lactulose to aim 2-3 BM, daily   --Continue diuretics lasix 20 tid and spironolactone 50mg BID  --Continue rifaximin 550mg BID      Gastroenterology follow up recommendations: TBD    Thank you for involving us in this patient's care. Please do not hesitate to contact the GI service with any questions or concerns.     Pt care plan discussed with Dr. Albarado, GI  staff physician.    Ezequiel Joshi MD  GI Fellow  [phone]  -------------------------------------------------------------------------------------------------------------------          Chief Complaint:   We were asked by Dr. Bethea of [facility] to evaluate this patient with Cirrhosis and possible cholecystitis     History is obtained from the patient and the medical record.          History of Present Illness:   Hazel Fatima is a 63 year old female with a history of alcoholic cirrhosis c/b ascites, choledocholithiasis s/p ERCP 5/2019 with sphincterotomy and biliary swept and history of biliary colic with multiple ED visits. Now presents with RUQ pain and was found to have elevated WBC count of 11.5 and US showing hydropic gallbladder with cholelithiasis, mild wall thickening and trace pericholecystic fluid concern for acute cholecystitis. Patient reports pain initially started back in MAY and since then the pain has been progressively worsening and more frequent to the point that she has been seen in the ED multiple times. Patient reports pain in RUQ radiates in band like to the back, she reports pain is burning type and not worsen after eating. She reports currently improved after pain medication has been given. Denies any fevers or chills no signs of bleeding and reports no worsening encephalopathy.             Past Medical History:   Reviewed and edited as appropriate  Past Medical History:   Diagnosis Date     Acute alcoholic hepatitis      Cirrhosis (H)      H/O migraine             Past Surgical History:   Reviewed and edited as appropriate   Past Surgical History:   Procedure Laterality Date     ABDOMEN SURGERY      C Section      GYN SURGERY      1993 and 1995 C Section             Previous Endoscopy:   No results found for this or any previous visit.         Social History:   Reviewed and edited as appropriate  Social History     Socioeconomic History     Marital status:      Spouse name: Not  on file     Number of children: Not on file     Years of education: Not on file     Highest education level: Not on file   Occupational History     Not on file   Social Needs     Financial resource strain: Not on file     Food insecurity:     Worry: Not on file     Inability: Not on file     Transportation needs:     Medical: Not on file     Non-medical: Not on file   Tobacco Use     Smoking status: Current Every Day Smoker     Packs/day: 0.30     Years: 12.00     Pack years: 3.60     Types: Cigarettes     Smokeless tobacco: Never Used   Substance and Sexual Activity     Alcohol use: Not Currently     Frequency: Never     Comment: Quit Jan 2, 2019. Drank 7 drinks a week.      Drug use: Never     Sexual activity: Not on file   Lifestyle     Physical activity:     Days per week: Not on file     Minutes per session: Not on file     Stress: Not on file   Relationships     Social connections:     Talks on phone: Not on file     Gets together: Not on file     Attends Religion service: Not on file     Active member of club or organization: Not on file     Attends meetings of clubs or organizations: Not on file     Relationship status: Not on file     Intimate partner violence:     Fear of current or ex partner: Not on file     Emotionally abused: Not on file     Physically abused: Not on file     Forced sexual activity: Not on file   Other Topics Concern     Parent/sibling w/ CABG, MI or angioplasty before 65F 55M? Not Asked   Social History Narrative     Not on file            Family History:   Reviewed and edited as appropriate  No known history of gastrointestinal/liver disease or  gastrointestinal malignancies       Allergies:   Reviewed and edited as appropriate   No Known Allergies         Medications:     Current Facility-Administered Medications   Medication     acetaminophen (TYLENOL) tablet 650 mg     Daily 2 GRAM acetaminophen limit, unless fulminent liver failure or transaminases greater than or equal to 300 -  "400, then none     furosemide (LASIX) tablet 20 mg     lactulose (CHRONULAC) solution 20 g    Or     lactulose (CHRONULAC) solution for enema prep 100 g     lactulose (CHRONULAC) solution 30 mL     lactulose (CHRONULAC) solution 30 mL     lidocaine (LMX4) cream     lidocaine (LMX4) cream     lidocaine 1 % 0.1-1 mL     lidocaine 1 % 0.1-1 mL     melatonin tablet 1 mg     naloxone (NARCAN) injection 0.1-0.4 mg     nicotine (COMMIT) lozenge 2 mg     nicotine (NICODERM CQ) 14 MG/24HR 24 hr patch 1 patch     nicotine Patch in Place     nicotine patch REMOVAL     omeprazole (priLOSEC) CR capsule 20 mg     ondansetron (ZOFRAN) injection 4 mg     ondansetron (ZOFRAN-ODT) ODT tab 4 mg     oxyCODONE (ROXICODONE) tablet 5 mg     piperacillin-tazobactam (ZOSYN) 3.375 g vial to attach to  mL bag     potassium chloride ER (K-DUR/KLOR-CON M) CR tablet 10 mEq     rifaximin (XIFAXAN) tablet 550 mg     senna-docusate (SENOKOT-S/PERICOLACE) 8.6-50 MG per tablet 1 tablet    Or     senna-docusate (SENOKOT-S/PERICOLACE) 8.6-50 MG per tablet 2 tablet     sodium chloride (PF) 0.9% PF flush 3 mL     sodium chloride (PF) 0.9% PF flush 3 mL     sodium chloride (PF) 0.9% PF flush 3 mL     sodium chloride (PF) 0.9% PF flush 3 mL     sodium chloride 0.9% infusion     spironolactone (ALDACTONE) tablet 50 mg             Review of Systems:   A complete review of systems was performed and is negative except as noted in the HPI           Physical Exam:   /52 (BP Location: Right arm)   Pulse 78   Temp 98.1  F (36.7  C) (Oral)   Resp 16   Ht 1.676 m (5' 6\")   Wt 64.2 kg (141 lb 9.6 oz)   SpO2 96%   BMI 22.85 kg/m    Wt:   Wt Readings from Last 2 Encounters:   09/29/19 64.2 kg (141 lb 9.6 oz)   06/25/19 65 kg (143 lb 4.8 oz)      Constitutional: cooperative, pleasant, not dyspneic/diaphoretic, no acute distress  Eyes: Sclera anicteric/injected  Ears/nose/mouth/throat: Normal oropharynx without ulcers or exudate, mucus membranes moist, " hearing intact  Neck: supple, thyroid normal size  CV: No edema  Respiratory: Unlabored breathing  Abd:  Nondistended, +bs, no hepatosplenomegaly, tender to palpation in RUQ no jaimes sign, no peritoneal signs  Skin: warm, perfused, no jaundice, chest small telangiectasias noted, and palmar erythema  Neuro: AAO x 3, No asterixis  Psych: Normal affect  MSK: No gross deformities         Data:   Labs and imaging below were independently reviewed and interpreted    BMP  Recent Labs   Lab 09/30/19  0755 09/29/19  1006    135   POTASSIUM 3.8 4.0   CHLORIDE 107 101   TAI 8.4* 9.0   CO2 22 25   BUN 9 9   CR 0.71 0.68   GLC 94 127*     CBC  Recent Labs   Lab 09/30/19  0755 09/29/19  1048 09/29/19  1006   WBC 13.3* 11.5* Canceled, Test credited   RBC 3.48* 3.83 Canceled, Test credited   HGB 11.1* 12.2 Canceled, Test credited   HCT 34.0* 36.2 Canceled, Test credited   MCV 98 95 Canceled, Test credited   MCH 31.9 31.9 Canceled, Test credited   MCHC 32.6 33.7 Canceled, Test credited   RDW 15.5* 15.4* Canceled, Test credited    180 Canceled, Test credited     INR  Recent Labs   Lab 09/30/19  0755 09/29/19  1006   INR 1.36* 1.33*     LFTs  Recent Labs   Lab 09/30/19  0755 09/29/19  1006   ALKPHOS 82 112   AST 23 26   ALT 16 14   BILITOTAL 2.6* 2.4*   PROTTOTAL 5.3* 6.1*   ALBUMIN 1.9* 2.2*      PANC  Recent Labs   Lab 09/29/19  1006   LIPASE 150       Imaging:  US Abdomen 9/29/19  IMPRESSION:  1.  Hydropic gallbladder with cholelithiasis, mild wall thickening,  and trace flow in the gallbladder wall. Consider nuclear medicine  hepatobiliary scan to evaluate for acute cholecystitis.  2.  Cirrhotic configuration of the liver with prominent varices in the  right mid abdomen beneath the liver. Trace ascites.

## 2019-09-30 NOTE — H&P
[facility]    History and Physical - [place] 1 Service        Date of Admission:  9/29/2019    Assessment & Plan   Hazel Fatima is a 63 year old female admitted on 9/29/2019. She has a history of ESLD 2/2 EtOH (no EtOH since 1/2019) and is admitted for cholecystitis    Abdominal Pain   cholecystitis  Patient with worsening RUQ pain radiating around abdomen and back. Seen in ED by surgery, risks outweighs benefits at this time for cholecystectomy. GI consulted for alternative interventions to relieve biliary sludge.   - NPO at [facility]  -Continue zosyn   - IV fluids  - oxycodone 5mg q4h PRN  - ondansetron 4mg q6h PRN  - zosyn 3.375 g q6h     ESLD--MELD-Na 15 on admission  Patient reports no bowel movements since Thursday. No AMS or asterixis on exam.   - PTA lactulose 30mL TID, with additional lactulose 30mL q2h PRN, titrate to 3-5 bowel movements per day  - Continue PTA spironolactone, furosemide, rifaximin  - Daily MELD labs  - GI consulted    Tobacco use  Discussed smoking cessation. Smokes 10 cigarettes per day.  - Nicotine replacement patches and lozenges PRN    Diet: NPO for Medical/Clinical Reasons Except for: Ice Chips, Meds    Fluids: NS 100cc/hr   DVT Prophylaxis: Pneumatic Compression Devices  Murphy Catheter: not present  Code Status: FULL    Disposition Plan   Expected discharge: 2 - 3 days, recommended to prior living arrangement once abdominal pain resolved and antibiotic plan in place.  Entered: Annette Hunt MD 09/29/2019, 11:57 PM       The patient's care was discussed with the Patient.    Annette Hunt MD  [facility] 1 Service  [facility]  Pager: [phone]  Please see sticky note for cross cover information  ______________________________________________________________________    Chief Complaint   Abdominal pain      History of Present Illness   Hazel Fatima is a 63 year old female admitted on 9/29/2019. She has a  history of ESLD 2/2 EtOH (no EtOH since 1/2019) and is admitted for cholecystitis without evidence of cholelithiasis. Patient with worsening RUQ pain radiating around abdomen and back. Had been told previously that the  would be the best hospital for addressing gall bladder disease in ESLD. Mild fever (99.9) at home. Chills with night sweats. Nausea with dry heaving. No CP/SOB. No bowel movements for 3 days despite taking lactulose at home. Missed one dose of lactulose today.     Review of Systems    The 10 point Review of Systems is negative other than noted in the HPI or here.    Past Medical History    I have reviewed this patient's medical history and updated it with pertinent information if needed.   Past Medical History:   Diagnosis Date     Acute alcoholic hepatitis      Cirrhosis (H)      H/O migraine         Past Surgical History   I have reviewed this patient's surgical history and updated it with pertinent information if needed.  Past Surgical History:   Procedure Laterality Date     ABDOMEN SURGERY      C Section      GYN SURGERY      1993 and 1995 C Section         Social History   I have reviewed this patient's social history and updated it with pertinent information if needed. Hazel Fatima  reports that she has been smoking cigarettes. She has a 3.60 pack-year smoking history. She has never used smokeless tobacco. She reports previous alcohol use. She reports that she does not use drugs.    Family History   I have reviewed this patient's family history and updated it with pertinent information if needed.   Family History   Problem Relation Age of Onset     Diabetes Type 2  Mother      Coronary Artery Disease Mother      Liver Cancer Maternal Grandfather      Diabetes Type 2  Father      Cerebrovascular Disease Father      Diabetes Type 2  Brother        Prior to Admission Medications   Prior to Admission Medications   Prescriptions Last Dose Informant Patient Reported? Taking?   furosemide  (LASIX) 20 MG tablet 9/28/2019 at 1500  Yes Yes   Sig: TAKE 3 TABLETS BY MOUTH EVERY DAY   lactulose (CHRONULAC) 10 GM/15ML solution 9/28/2019 at 2100  Yes Yes   Sig: Take 30 mLs by mouth   omeprazole (PRILOSEC) 20 MG DR capsule 9/28/2019 at 0800  Yes Yes   Sig: Take 20 mg by mouth daily    ondansetron (ZOFRAN-ODT) 4 MG ODT tab 9/29/2019 at 1800  Yes Yes   Sig: Take 4 mg by mouth every 6 hours as needed for nausea   potassium chloride ER (MICRO-K) 10 MEQ CR capsule 9/28/2019 at 1800  Yes Yes   Sig: Take 10 mEq by mouth daily    spironolactone (ALDACTONE) 50 MG tablet 9/28/2019 at 2100  Yes Yes   Sig: TAKE 2 TABLETS BY MOUTH EVERY DAY      Facility-Administered Medications: None     Allergies   No Known Allergies    Physical Exam   Vital Signs: Temp: 98.7  F (37.1  C) Temp src: Oral BP: 109/54 Pulse: 70 Heart Rate: 81 Resp: 16 SpO2: 98 % O2 Device: None (Room air)    Weight: 141 lbs 9.6 oz    Exam:  Constitutional: Alert, oriented x4, no distress and cooperative  HEENT: Normocephalic, PERLL, EOMI, anicteric sclera  Cardiovascular: RRR. No edema or JVD.  Respiratory: Good diaphragmatic excursion. No wheezes  Gastrointestinal: Abdomen soft, non-tender, moderate distention. BS normal.   : Deferred  Musculoskeletal: Extremities normal with no gross deformities noted, normal muscle tone, peripheral pulses normal and normal ROM.   Skin: No suspicious lesions or rashes  Neurologic: Gait normal. Sensation grossly WNL. No asterixis  Psychiatric: Mentation appears normal and affect normal/bright      Data   Data reviewed today: I reviewed all medications, new labs and imaging results over the last 24 hours.  Recent Labs   Lab 09/29/19  1048 09/29/19  1006   WBC 11.5* Canceled, Test credited   HGB 12.2 Canceled, Test credited   MCV 95 Canceled, Test credited    Canceled, Test credited   INR  --  1.33*   NA  --  135   POTASSIUM  --  4.0   CHLORIDE  --  101   CO2  --  25   BUN  --  9   CR  --  0.68   ANIONGAP  --  9    TAI  --  9.0   GLC  --  127*   ALBUMIN  --  2.2*   PROTTOTAL  --  6.1*   BILITOTAL  --  2.4*   ALKPHOS  --  112   ALT  --  14   AST  --  26   LIPASE  --  150   TROPI  --  <0.015     Recent Results (from the past 24 hour(s))   US Abdomen Limited    Narrative     EXAMINATION: US ABDOMEN LIMITED  9/29/2019 10:57 AM      CLINICAL HISTORY: abdominal pain ruq, hx of gallstone eval for  cholecystitis    COMPARISON: MRI 9/13/2019, ultrasound 9/12/2019        FINDINGS:  Nodular liver surface. The hepatic parenchyma demonstrates a coarsened  hepatic echotexture. There is no intrahepatic or extrahepatic biliary  ductal dilatation.     The common bile duct measures 4 mm.     Hydropic gallbladder with layering biliary sludge/stones, including a  2.5 cm stone layering dependently. Gallbladder wall measures 4 mm in  thickness and there is trace flow in the gallbladder wall seen on  image 28.     Prominent varices inferior to the right liver. Patent main portal vein  with normal directional flow.    The pancreas is not well visualized.    The visualized upper abdominal aorta and inferior vena cava are  normal.      The right kidney measures 10.4 cm. There is no significant urinary  tract dilation.    Trace ascites.      Impression    IMPRESSION:  1.  Hydropic gallbladder with cholelithiasis, mild wall thickening,  and trace flow in the gallbladder wall. Consider nuclear medicine  hepatobiliary scan to evaluate for acute cholecystitis.  2.  Cirrhotic configuration of the liver with prominent varices in the  right mid abdomen beneath the liver. Trace ascites.    I have personally reviewed the examination and initial interpretation  and I agree with the findings.    BRIDGET BURRELL MD     Physician Attestation   IZohaib saw this patient with the resident and agree with the resident s findings and plan of care as documented in the resident s note    I personally reviewed vital signs, medications, labs and imaging.    Zohaib  GUILLERMO Grubbs  Date of Service (when I saw the patient): 9/29/19

## 2019-10-01 ENCOUNTER — ANESTHESIA (OUTPATIENT)
Dept: SURGERY | Facility: CLINIC | Age: 64
DRG: 445 | End: 2019-10-01
Payer: COMMERCIAL

## 2019-10-01 ENCOUNTER — APPOINTMENT (OUTPATIENT)
Dept: GENERAL RADIOLOGY | Facility: CLINIC | Age: 64
DRG: 445 | End: 2019-10-01
Attending: INTERNAL MEDICINE
Payer: COMMERCIAL

## 2019-10-01 ENCOUNTER — ANESTHESIA EVENT (OUTPATIENT)
Dept: SURGERY | Facility: CLINIC | Age: 64
DRG: 445 | End: 2019-10-01
Payer: COMMERCIAL

## 2019-10-01 LAB
ALBUMIN SERPL-MCNC: 2.1 G/DL (ref 3.4–5)
ALP SERPL-CCNC: 95 U/L (ref 40–150)
ALT SERPL W P-5'-P-CCNC: 14 U/L (ref 0–50)
ANION GAP SERPL CALCULATED.3IONS-SCNC: 9 MMOL/L (ref 3–14)
AST SERPL W P-5'-P-CCNC: 25 U/L (ref 0–45)
BASOPHILS # BLD AUTO: 0.1 10E9/L (ref 0–0.2)
BASOPHILS NFR BLD AUTO: 0.4 %
BILIRUB SERPL-MCNC: 2.7 MG/DL (ref 0.2–1.3)
BUN SERPL-MCNC: 8 MG/DL (ref 7–30)
CALCIUM SERPL-MCNC: 8.5 MG/DL (ref 8.5–10.1)
CHLORIDE SERPL-SCNC: 106 MMOL/L (ref 94–109)
CO2 SERPL-SCNC: 24 MMOL/L (ref 20–32)
CREAT SERPL-MCNC: 0.74 MG/DL (ref 0.52–1.04)
DIFFERENTIAL METHOD BLD: ABNORMAL
EOSINOPHIL # BLD AUTO: 0.2 10E9/L (ref 0–0.7)
EOSINOPHIL NFR BLD AUTO: 1.2 %
ERCP: NORMAL
ERYTHROCYTE [DISTWIDTH] IN BLOOD BY AUTOMATED COUNT: 15.6 % (ref 10–15)
GFR SERPL CREATININE-BSD FRML MDRD: 88 ML/MIN/{1.73_M2}
GLUCOSE BLDC GLUCOMTR-MCNC: 140 MG/DL (ref 70–99)
GLUCOSE BLDC GLUCOMTR-MCNC: 97 MG/DL (ref 70–99)
GLUCOSE SERPL-MCNC: 101 MG/DL (ref 70–99)
HCT VFR BLD AUTO: 35.7 % (ref 35–47)
HGB BLD-MCNC: 11.4 G/DL (ref 11.7–15.7)
IMM GRANULOCYTES # BLD: 0.1 10E9/L (ref 0–0.4)
IMM GRANULOCYTES NFR BLD: 0.9 %
INR PPP: 1.56 (ref 0.86–1.14)
LYMPHOCYTES # BLD AUTO: 1.8 10E9/L (ref 0.8–5.3)
LYMPHOCYTES NFR BLD AUTO: 11.2 %
MCH RBC QN AUTO: 31.6 PG (ref 26.5–33)
MCHC RBC AUTO-ENTMCNC: 31.9 G/DL (ref 31.5–36.5)
MCV RBC AUTO: 99 FL (ref 78–100)
MONOCYTES # BLD AUTO: 2.9 10E9/L (ref 0–1.3)
MONOCYTES NFR BLD AUTO: 17.9 %
NEUTROPHILS # BLD AUTO: 11 10E9/L (ref 1.6–8.3)
NEUTROPHILS NFR BLD AUTO: 68.4 %
NRBC # BLD AUTO: 0 10*3/UL
NRBC BLD AUTO-RTO: 0 /100
PLATELET # BLD AUTO: 185 10E9/L (ref 150–450)
POTASSIUM SERPL-SCNC: 3.5 MMOL/L (ref 3.4–5.3)
PROT SERPL-MCNC: 5.7 G/DL (ref 6.8–8.8)
RBC # BLD AUTO: 3.61 10E12/L (ref 3.8–5.2)
SODIUM SERPL-SCNC: 138 MMOL/L (ref 133–144)
WBC # BLD AUTO: 16.1 10E9/L (ref 4–11)

## 2019-10-01 PROCEDURE — 25000125 ZZHC RX 250: Performed by: NURSE ANESTHETIST, CERTIFIED REGISTERED

## 2019-10-01 PROCEDURE — 25000132 ZZH RX MED GY IP 250 OP 250 PS 637: Performed by: STUDENT IN AN ORGANIZED HEALTH CARE EDUCATION/TRAINING PROGRAM

## 2019-10-01 PROCEDURE — 37000009 ZZH ANESTHESIA TECHNICAL FEE, EACH ADDTL 15 MIN: Performed by: INTERNAL MEDICINE

## 2019-10-01 PROCEDURE — 40000170 ZZH STATISTIC PRE-PROCEDURE ASSESSMENT II: Performed by: INTERNAL MEDICINE

## 2019-10-01 PROCEDURE — 25000128 H RX IP 250 OP 636: Performed by: FAMILY MEDICINE

## 2019-10-01 PROCEDURE — 0FC98ZZ EXTIRPATION OF MATTER FROM COMMON BILE DUCT, VIA NATURAL OR ARTIFICIAL OPENING ENDOSCOPIC: ICD-10-PCS | Performed by: INTERNAL MEDICINE

## 2019-10-01 PROCEDURE — 25800030 ZZH RX IP 258 OP 636: Performed by: FAMILY MEDICINE

## 2019-10-01 PROCEDURE — 12000001 ZZH R&B MED SURG/OB UMMC

## 2019-10-01 PROCEDURE — 25000128 H RX IP 250 OP 636: Performed by: NURSE ANESTHETIST, CERTIFIED REGISTERED

## 2019-10-01 PROCEDURE — 36415 COLL VENOUS BLD VENIPUNCTURE: CPT | Performed by: STUDENT IN AN ORGANIZED HEALTH CARE EDUCATION/TRAINING PROGRAM

## 2019-10-01 PROCEDURE — 25000128 H RX IP 250 OP 636: Performed by: STUDENT IN AN ORGANIZED HEALTH CARE EDUCATION/TRAINING PROGRAM

## 2019-10-01 PROCEDURE — 85025 COMPLETE CBC W/AUTO DIFF WBC: CPT | Performed by: STUDENT IN AN ORGANIZED HEALTH CARE EDUCATION/TRAINING PROGRAM

## 2019-10-01 PROCEDURE — 25800030 ZZH RX IP 258 OP 636: Performed by: NURSE ANESTHETIST, CERTIFIED REGISTERED

## 2019-10-01 PROCEDURE — 00000146 ZZHCL STATISTIC GLUCOSE BY METER IP

## 2019-10-01 PROCEDURE — 25000132 ZZH RX MED GY IP 250 OP 250 PS 637: Performed by: INTERNAL MEDICINE

## 2019-10-01 PROCEDURE — 71000014 ZZH RECOVERY PHASE 1 LEVEL 2 FIRST HR: Performed by: INTERNAL MEDICINE

## 2019-10-01 PROCEDURE — 25000566 ZZH SEVOFLURANE, EA 15 MIN: Performed by: INTERNAL MEDICINE

## 2019-10-01 PROCEDURE — 0F798DZ DILATION OF COMMON BILE DUCT WITH INTRALUMINAL DEVICE, VIA NATURAL OR ARTIFICIAL OPENING ENDOSCOPIC: ICD-10-PCS | Performed by: INTERNAL MEDICINE

## 2019-10-01 PROCEDURE — 27210794 ZZH OR GENERAL SUPPLY STERILE: Performed by: INTERNAL MEDICINE

## 2019-10-01 PROCEDURE — 85610 PROTHROMBIN TIME: CPT | Performed by: STUDENT IN AN ORGANIZED HEALTH CARE EDUCATION/TRAINING PROGRAM

## 2019-10-01 PROCEDURE — C1769 GUIDE WIRE: HCPCS | Performed by: INTERNAL MEDICINE

## 2019-10-01 PROCEDURE — 25000125 ZZHC RX 250: Performed by: INTERNAL MEDICINE

## 2019-10-01 PROCEDURE — C1877 STENT, NON-COAT/COV W/O DEL: HCPCS | Performed by: INTERNAL MEDICINE

## 2019-10-01 PROCEDURE — 25000128 H RX IP 250 OP 636: Performed by: INTERNAL MEDICINE

## 2019-10-01 PROCEDURE — 37000008 ZZH ANESTHESIA TECHNICAL FEE, 1ST 30 MIN: Performed by: INTERNAL MEDICINE

## 2019-10-01 PROCEDURE — 36000061 ZZH SURGERY LEVEL 3 W FLUORO 1ST 30 MIN - UMMC: Performed by: INTERNAL MEDICINE

## 2019-10-01 PROCEDURE — 25000132 ZZH RX MED GY IP 250 OP 250 PS 637: Performed by: PEDIATRICS

## 2019-10-01 PROCEDURE — 25800030 ZZH RX IP 258 OP 636: Performed by: INTERNAL MEDICINE

## 2019-10-01 PROCEDURE — 40000882 ZZH CANCELLED SURGERY UP TO 46-60 MINS: Performed by: INTERNAL MEDICINE

## 2019-10-01 PROCEDURE — C1726 CATH, BAL DIL, NON-VASCULAR: HCPCS | Performed by: INTERNAL MEDICINE

## 2019-10-01 PROCEDURE — 40000279 XR SURGERY CARM FLUORO GREATER THAN 5 MIN W STILLS: Mod: TC

## 2019-10-01 PROCEDURE — 25500064 ZZH RX 255 OP 636: Performed by: INTERNAL MEDICINE

## 2019-10-01 PROCEDURE — 80053 COMPREHEN METABOLIC PANEL: CPT | Performed by: STUDENT IN AN ORGANIZED HEALTH CARE EDUCATION/TRAINING PROGRAM

## 2019-10-01 PROCEDURE — 36000059 ZZH SURGERY LEVEL 3 EA 15 ADDTL MIN UMMC: Performed by: INTERNAL MEDICINE

## 2019-10-01 PROCEDURE — 99232 SBSQ HOSP IP/OBS MODERATE 35: CPT | Mod: GC | Performed by: INTERNAL MEDICINE

## 2019-10-01 PROCEDURE — 25000131 ZZH RX MED GY IP 250 OP 636 PS 637: Performed by: INTERNAL MEDICINE

## 2019-10-01 DEVICE — STENT BILIARY COMPASS BDS 7FRX15CM DBL PIGTAIL G55521: Type: IMPLANTABLE DEVICE | Site: BILE DUCT | Status: FUNCTIONAL

## 2019-10-01 RX ORDER — HYDROMORPHONE HYDROCHLORIDE 1 MG/ML
.3-.5 INJECTION, SOLUTION INTRAMUSCULAR; INTRAVENOUS; SUBCUTANEOUS EVERY 5 MIN PRN
Status: DISCONTINUED | OUTPATIENT
Start: 2019-10-01 | End: 2019-10-01 | Stop reason: HOSPADM

## 2019-10-01 RX ORDER — FENTANYL CITRATE 50 UG/ML
25-50 INJECTION, SOLUTION INTRAMUSCULAR; INTRAVENOUS EVERY 5 MIN PRN
Status: DISCONTINUED | OUTPATIENT
Start: 2019-10-01 | End: 2019-10-01 | Stop reason: HOSPADM

## 2019-10-01 RX ORDER — ONDANSETRON 2 MG/ML
4 INJECTION INTRAMUSCULAR; INTRAVENOUS EVERY 30 MIN PRN
Status: DISCONTINUED | OUTPATIENT
Start: 2019-10-01 | End: 2019-10-01 | Stop reason: HOSPADM

## 2019-10-01 RX ORDER — ONDANSETRON 4 MG/1
4 TABLET, ORALLY DISINTEGRATING ORAL EVERY 30 MIN PRN
Status: DISCONTINUED | OUTPATIENT
Start: 2019-10-01 | End: 2019-10-01 | Stop reason: HOSPADM

## 2019-10-01 RX ORDER — FENTANYL CITRATE 50 UG/ML
INJECTION, SOLUTION INTRAMUSCULAR; INTRAVENOUS PRN
Status: DISCONTINUED | OUTPATIENT
Start: 2019-10-01 | End: 2019-10-01

## 2019-10-01 RX ORDER — FENTANYL CITRATE 50 UG/ML
25-50 INJECTION, SOLUTION INTRAMUSCULAR; INTRAVENOUS
Status: DISCONTINUED | OUTPATIENT
Start: 2019-10-01 | End: 2019-10-01 | Stop reason: HOSPADM

## 2019-10-01 RX ORDER — IOPAMIDOL 510 MG/ML
INJECTION, SOLUTION INTRAVASCULAR PRN
Status: DISCONTINUED | OUTPATIENT
Start: 2019-10-01 | End: 2019-10-01 | Stop reason: HOSPADM

## 2019-10-01 RX ORDER — HYDROMORPHONE HYDROCHLORIDE 1 MG/ML
.3-.5 INJECTION, SOLUTION INTRAMUSCULAR; INTRAVENOUS; SUBCUTANEOUS EVERY 10 MIN PRN
Status: DISCONTINUED | OUTPATIENT
Start: 2019-10-01 | End: 2019-10-01 | Stop reason: HOSPADM

## 2019-10-01 RX ORDER — SODIUM CHLORIDE, SODIUM LACTATE, POTASSIUM CHLORIDE, CALCIUM CHLORIDE 600; 310; 30; 20 MG/100ML; MG/100ML; MG/100ML; MG/100ML
INJECTION, SOLUTION INTRAVENOUS CONTINUOUS
Status: DISCONTINUED | OUTPATIENT
Start: 2019-10-01 | End: 2019-10-01 | Stop reason: HOSPADM

## 2019-10-01 RX ORDER — NALOXONE HYDROCHLORIDE 0.4 MG/ML
.1-.4 INJECTION, SOLUTION INTRAMUSCULAR; INTRAVENOUS; SUBCUTANEOUS
Status: DISCONTINUED | OUTPATIENT
Start: 2019-10-01 | End: 2019-10-01 | Stop reason: HOSPADM

## 2019-10-01 RX ORDER — MEPERIDINE HYDROCHLORIDE 25 MG/ML
12.5 INJECTION INTRAMUSCULAR; INTRAVENOUS; SUBCUTANEOUS
Status: DISCONTINUED | OUTPATIENT
Start: 2019-10-01 | End: 2019-10-01 | Stop reason: HOSPADM

## 2019-10-01 RX ORDER — SODIUM CHLORIDE, SODIUM LACTATE, POTASSIUM CHLORIDE, CALCIUM CHLORIDE 600; 310; 30; 20 MG/100ML; MG/100ML; MG/100ML; MG/100ML
INJECTION, SOLUTION INTRAVENOUS CONTINUOUS
Status: DISCONTINUED | OUTPATIENT
Start: 2019-10-01 | End: 2019-10-01

## 2019-10-01 RX ORDER — SODIUM CHLORIDE, SODIUM LACTATE, POTASSIUM CHLORIDE, CALCIUM CHLORIDE 600; 310; 30; 20 MG/100ML; MG/100ML; MG/100ML; MG/100ML
INJECTION, SOLUTION INTRAVENOUS CONTINUOUS PRN
Status: DISCONTINUED | OUTPATIENT
Start: 2019-10-01 | End: 2019-10-01

## 2019-10-01 RX ORDER — LIDOCAINE HYDROCHLORIDE 20 MG/ML
INJECTION, SOLUTION INFILTRATION; PERINEURAL PRN
Status: DISCONTINUED | OUTPATIENT
Start: 2019-10-01 | End: 2019-10-01

## 2019-10-01 RX ORDER — PROPOFOL 10 MG/ML
INJECTION, EMULSION INTRAVENOUS PRN
Status: DISCONTINUED | OUTPATIENT
Start: 2019-10-01 | End: 2019-10-01

## 2019-10-01 RX ORDER — LIDOCAINE 40 MG/G
CREAM TOPICAL
Status: DISCONTINUED | OUTPATIENT
Start: 2019-10-01 | End: 2019-10-01 | Stop reason: HOSPADM

## 2019-10-01 RX ORDER — NALOXONE HYDROCHLORIDE 0.4 MG/ML
.1-.4 INJECTION, SOLUTION INTRAMUSCULAR; INTRAVENOUS; SUBCUTANEOUS
Status: DISCONTINUED | OUTPATIENT
Start: 2019-10-01 | End: 2019-10-01

## 2019-10-01 RX ORDER — INDOMETHACIN 50 MG/1
100 SUPPOSITORY RECTAL
Status: COMPLETED | OUTPATIENT
Start: 2019-10-01 | End: 2019-10-01

## 2019-10-01 RX ORDER — ONDANSETRON 2 MG/ML
INJECTION INTRAMUSCULAR; INTRAVENOUS PRN
Status: DISCONTINUED | OUTPATIENT
Start: 2019-10-01 | End: 2019-10-01

## 2019-10-01 RX ADMIN — PIPERACILLIN SODIUM AND TAZOBACTAM SODIUM 3.38 G: 3; .375 INJECTION, POWDER, LYOPHILIZED, FOR SOLUTION INTRAVENOUS at 00:52

## 2019-10-01 RX ADMIN — PIPERACILLIN SODIUM AND TAZOBACTAM SODIUM 3.38 G: 3; .375 INJECTION, POWDER, LYOPHILIZED, FOR SOLUTION INTRAVENOUS at 20:16

## 2019-10-01 RX ADMIN — FENTANYL CITRATE 50 MCG: 50 INJECTION, SOLUTION INTRAMUSCULAR; INTRAVENOUS at 11:21

## 2019-10-01 RX ADMIN — OMEPRAZOLE 20 MG: 20 CAPSULE, DELAYED RELEASE ORAL at 08:18

## 2019-10-01 RX ADMIN — ROCURONIUM BROMIDE 40 MG: 10 INJECTION INTRAVENOUS at 09:49

## 2019-10-01 RX ADMIN — SPIRONOLACTONE 50 MG: 25 TABLET ORAL at 15:25

## 2019-10-01 RX ADMIN — FUROSEMIDE 20 MG: 20 TABLET ORAL at 08:19

## 2019-10-01 RX ADMIN — POTASSIUM CHLORIDE 10 MEQ: 750 TABLET, EXTENDED RELEASE ORAL at 08:18

## 2019-10-01 RX ADMIN — PIPERACILLIN SODIUM AND TAZOBACTAM SODIUM 3.38 G: 3; .375 INJECTION, POWDER, LYOPHILIZED, FOR SOLUTION INTRAVENOUS at 08:20

## 2019-10-01 RX ADMIN — SUGAMMADEX 100 MG: 100 INJECTION, SOLUTION INTRAVENOUS at 11:28

## 2019-10-01 RX ADMIN — ROCURONIUM BROMIDE 10 MG: 10 INJECTION INTRAVENOUS at 11:17

## 2019-10-01 RX ADMIN — LACTULOSE 30 ML: 20 SOLUTION ORAL at 20:17

## 2019-10-01 RX ADMIN — MIDAZOLAM 2 MG: 1 INJECTION INTRAMUSCULAR; INTRAVENOUS at 09:37

## 2019-10-01 RX ADMIN — OXYCODONE HYDROCHLORIDE 5 MG: 5 TABLET ORAL at 17:55

## 2019-10-01 RX ADMIN — LIDOCAINE HYDROCHLORIDE 60 MG: 20 INJECTION, SOLUTION INFILTRATION; PERINEURAL at 09:49

## 2019-10-01 RX ADMIN — OXYCODONE HYDROCHLORIDE 5 MG: 5 TABLET ORAL at 22:34

## 2019-10-01 RX ADMIN — LACTULOSE 30 ML: 20 SOLUTION ORAL at 08:18

## 2019-10-01 RX ADMIN — PIPERACILLIN SODIUM AND TAZOBACTAM SODIUM 3.38 G: 3; .375 INJECTION, POWDER, LYOPHILIZED, FOR SOLUTION INTRAVENOUS at 13:49

## 2019-10-01 RX ADMIN — SUGAMMADEX 100 MG: 100 INJECTION, SOLUTION INTRAVENOUS at 11:32

## 2019-10-01 RX ADMIN — PHENYLEPHRINE HYDROCHLORIDE 100 MCG: 10 INJECTION INTRAVENOUS at 10:10

## 2019-10-01 RX ADMIN — PROPOFOL 40 MG: 10 INJECTION, EMULSION INTRAVENOUS at 11:23

## 2019-10-01 RX ADMIN — FUROSEMIDE 20 MG: 20 TABLET ORAL at 17:13

## 2019-10-01 RX ADMIN — LACTULOSE 30 ML: 20 SOLUTION ORAL at 13:49

## 2019-10-01 RX ADMIN — SODIUM CHLORIDE 1000 ML: 9 INJECTION, SOLUTION INTRAVENOUS at 15:26

## 2019-10-01 RX ADMIN — SODIUM CHLORIDE, POTASSIUM CHLORIDE, SODIUM LACTATE AND CALCIUM CHLORIDE: 600; 310; 30; 20 INJECTION, SOLUTION INTRAVENOUS at 09:37

## 2019-10-01 RX ADMIN — ONDANSETRON 4 MG: 2 INJECTION INTRAMUSCULAR; INTRAVENOUS at 10:10

## 2019-10-01 RX ADMIN — ONDANSETRON 4 MG: 4 TABLET, ORALLY DISINTEGRATING ORAL at 22:36

## 2019-10-01 RX ADMIN — FUROSEMIDE 20 MG: 20 TABLET ORAL at 13:54

## 2019-10-01 RX ADMIN — SPIRONOLACTONE 50 MG: 25 TABLET ORAL at 08:19

## 2019-10-01 RX ADMIN — FENTANYL CITRATE 25 MCG: 50 INJECTION, SOLUTION INTRAMUSCULAR; INTRAVENOUS at 11:01

## 2019-10-01 RX ADMIN — RIFAXIMIN 550 MG: 550 TABLET ORAL at 08:18

## 2019-10-01 RX ADMIN — SODIUM CHLORIDE 1000 ML: 9 INJECTION, SOLUTION INTRAVENOUS at 00:44

## 2019-10-01 RX ADMIN — OXYCODONE HYDROCHLORIDE 5 MG: 5 TABLET ORAL at 03:22

## 2019-10-01 RX ADMIN — PROPOFOL 120 MG: 10 INJECTION, EMULSION INTRAVENOUS at 09:49

## 2019-10-01 RX ADMIN — RIFAXIMIN 550 MG: 550 TABLET ORAL at 20:17

## 2019-10-01 RX ADMIN — OXYCODONE HYDROCHLORIDE 5 MG: 5 TABLET ORAL at 08:32

## 2019-10-01 RX ADMIN — ONDANSETRON 4 MG: 2 INJECTION INTRAMUSCULAR; INTRAVENOUS at 02:47

## 2019-10-01 RX ADMIN — FENTANYL CITRATE 50 MCG: 50 INJECTION, SOLUTION INTRAMUSCULAR; INTRAVENOUS at 09:49

## 2019-10-01 ASSESSMENT — LIFESTYLE VARIABLES: TOBACCO_USE: 1

## 2019-10-01 ASSESSMENT — ACTIVITIES OF DAILY LIVING (ADL)
ADLS_ACUITY_SCORE: 11

## 2019-10-01 ASSESSMENT — PAIN DESCRIPTION - DESCRIPTORS
DESCRIPTORS: ACHING
DESCRIPTORS: ACHING

## 2019-10-01 NOTE — OR NURSING
Pt scheduled for ERCP this AM. Received oral AM meds today at 0820.  Dr. Marin informed. No further orders. Will continue with PreProcdure process on 3C/PreOp.

## 2019-10-01 NOTE — ANESTHESIA POSTPROCEDURE EVALUATION
Anesthesia POST Procedure Evaluation    Patient: Hazel Fatima   MRN:     [id number] Gender:   female   Age:    63 year old :      1955        Preoperative Diagnosis: Cholecystitis [K81.9]   Procedure(s):  ENDOSCOPIC RETROGRADE CHOLANGIOPANCREATOGRAPHY WITH BILIARY SPHINCTEROTOMY, STONE EXTRACTION AND GALLBLADDER STENT PLACEMENT   Postop Comments: No value filed.       Anesthesia Type:  Not documented  General    Reportable Event: NO     PAIN: Uncomplicated   Sign Out status: Comfortable, Well controlled pain     PONV: No PONV   Sign Out status:  No Nausea or Vomiting     Neuro/Psych: Uneventful perioperative course   Sign Out Status: Preoperative baseline; Age appropriate mentation     Airway/Resp.: Uneventful perioperative course   Sign Out Status: Non labored breathing, age appropriate RR; Resp. Status within EXPECTED Parameters     CV: Uneventful perioperative course   Sign Out status: Appropriate BP and perfusion indices; Appropriate HR/Rhythm     Disposition:   Sign Out in:  PACU  Disposition:  Phase II; Home  Recovery Course: Uneventful  Follow-Up: Not required           Last Anesthesia Record Vitals:  CRNA VITALS  10/1/2019 1113 - 10/1/2019 1203      10/1/2019             NIBP:  132/74    Ht Rate:  84    SpO2:  99 %    EKG:  Sinus rhythm          Last PACU Vitals:  Vitals Value Taken Time   /72 10/1/2019 12:00 PM   Temp 36.6  C (97.9  F) 10/1/2019 11:45 AM   Pulse 86 10/1/2019 12:00 PM   Resp 16 10/1/2019 11:45 AM   SpO2 99 % 10/1/2019 12:02 PM   Temp src     NIBP 132/74 10/1/2019 11:45 AM   Pulse     SpO2 99 % 10/1/2019 11:45 AM   Resp     Temp     Ht Rate 84 10/1/2019 11:45 AM   Temp 2     Vitals shown include unvalidated device data.      Electronically Signed By: Analisa Marin MD, 2019, 12:03 PM

## 2019-10-01 NOTE — PLAN OF CARE
"/67   Pulse 82   Temp 96.5  F (35.8  C) (Oral)   Resp 16   Ht 1.676 m (5' 6\")   Wt 64.2 kg (141 lb 9.6 oz)   SpO2 95%   BMI 22.85 kg/m      Shift: 8870-1333   Reason for Admission: RUQ abdominal pain with nausea.  VS: VSS on RA. Capno on  Neuros: A/Ox4, able to make needs known. Lethargic. Pt feels \"foggy\" after surgery. Scoring 0 on [place] protocol   GI/: 5 BMs this shift- on lactulose. Voiding adequately   Diet: Tolerating FLD  Drains/Lines: PIV SL?  Activity: SBA  Pain/Nausea: C/o of abdominal pain- PRN Oxycodone given. Denies nausea?   Respiratory/Trach: Sats > 94% on RA ?   New this shift: ERCP today- stent placed with bilairy sphincterotomy and removal of stones  Plan of care: Will continue to monitor and follow POC.   "

## 2019-10-01 NOTE — PROGRESS NOTES
[facility] Daily Progress Note    Hazel Fatima is a 63 year old female admitted on 9/29/2019. 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    Interval History/Subjective:  Saw after ERCP, therefore patient feeling quite sleepy. Slight abdominal pain but improved overall. She denied chest pain or SOB. She is having frequent stooling.     Objective    Physical Exam:   Temp:  [96.5  F (35.8  C)-98.1  F (36.7  C)] 96.5  F (35.8  C)  Pulse:  [76-92] 82  Heart Rate:  [76-85] 82  Resp:  [15-20] 16  BP: ()/(42-72) 132/67  SpO2:  [94 %-100 %] 95 %    Constitutional: Sleepy but arousable, no distress and cooperative  HEENT: Normocephalic, PERRL, EOMI, anicteric sclera  Cardiovascular: RRR. No edema or JVD.  Respiratory: Good diaphragmatic excursion. No wheezes  Gastrointestinal: Abdomen soft, slight RUQ tenderness, BS normal.   Musculoskeletal: Extremities normal with no gross deformities noted, normal muscle tone, peripheral pulses normal and normal ROM.   Skin: No suspicious lesions or rashes  Neurologic: Moves all 4 extremities purposefully     Labs  CMP  Recent Labs   Lab 10/01/19  0656 09/30/19  0755 09/29/19  1006    138 135   POTASSIUM 3.5 3.8 4.0   CHLORIDE 106 107 101   CO2 24 22 25   ANIONGAP 9 9 9   * 94 127*   BUN 8 9 9   CR 0.74 0.71 0.68   GFRESTIMATED 88 >90 >90   GFRESTBLACK >90 >90 >90   TAI 8.5 8.4* 9.0   PROTTOTAL 5.7* 5.3* 6.1*   ALBUMIN 2.1* 1.9* 2.2*   BILITOTAL 2.7* 2.6* 2.4*   ALKPHOS 95 82 112   AST 25 23 26   ALT 14 16 14     CBC  Recent Labs   Lab 10/01/19  0656 09/30/19  0755 09/29/19  1048 09/29/19  1006   WBC 16.1* 13.3* 11.5* Canceled, Test credited   RBC 3.61* 3.48* 3.83 Canceled, Test credited   HGB 11.4* 11.1* 12.2 Canceled, Test credited   HCT 35.7 34.0* 36.2 Canceled, Test credited   MCV 99 98 95  Canceled, Test credited   MCH 31.6 31.9 31.9 Canceled, Test credited   MCHC 31.9 32.6 33.7 Canceled, Test credited   RDW 15.6* 15.5* 15.4* Canceled, Test credited    163 180 Canceled, Test credited     INR  Recent Labs   Lab 10/01/19  0656 09/30/19  0755 09/29/19  1006   INR 1.56* 1.36* 1.33*     Imaging  US RUQ 9/29: IMPRESSION:  1.  Hydropic gallbladder with cholelithiasis, mild wall thickening,  and trace flow in the gallbladder wall. Consider nuclear medicine  hepatobiliary scan to evaluate for acute cholecystitis.  2.  Cirrhotic configuration of the liver with prominent varices in the  right mid abdomen beneath the liver. Trace ascites.    ERCP 10/1:   - Cystic duct obstructed by stones with cholelithiasis   - Successful cystic dilation and placement of a 7F double pig tailed Compass transpapillary stent (pus drainage)   - Uncomplicated bilairy sphincterotomy   - Successful removal of stone concretion from the main     Assessment and Plan:   Hazel Fatima is a 63 year old female admitted on 9/29/2019. 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      Abdominal Pain  Cholelithiasis  Patient with worsening RUQ pain radiating around abdomen and back. Seen in ED by surgery, risks outweighs benefits at this time for cholecystectomy. GI consulted for alternative interventions to relieve biliary sludge. ERCP 10/1 which found obstructed cystic duct by stones with cholelithiasis s/p transpapillary stent.   - GI consult, appreciate recs  - Complete course of antibiotics, consider de-escalation to Amox/Clav 10/2   - Advance diet as tolerated   - Discontinued IVFs   - Oxycodone 5mg q4h PRN  - Ondansetron 4mg q6h PRN     ESLD--MELD-Na 15 on admission  Constipation - resolved   Patient reports no bowel movements since last week upon admission. No AMS or asterixis on exam.   - PTA lactulose 30mL TID, with additional lactulose 30mL  q2h PRN, titrate to 3-5 bowel movements per day  - Continue PTA spironolactone, furosemide, rifaximin  - Daily MELD labs  - GI consulted    Tobacco use  Discussed smoking cessation. Smokes 10 cigarettes per day.  - Nicotine replacement patches and lozenges PRN     Diet: Advance as tolerated   Fluids: None   DVT Prophylaxis: Pneumatic Compression Devices  Murphy Catheter: not present  Code Status: FULL    Patient seen and discussed with Dr. Torres.     Terri Piña DO  PGY2 Internal Medicine   [phone]

## 2019-10-01 NOTE — OP NOTE
ERCP 10/01/2019  9:20 AM [facility], [address] [place] [address] [phone]     Endoscopy Department   _______________________________________________________________________________   Patient Name: Hazel Fatima         Procedure Date: 10/1/2019 9:20 AM   MRN: [id number]                       Account Number: [id number]   YOB: 1955              Admit Type: Inpatient   Age: 63                               Room: OR   Gender: Female                        Note Status: Finalized   Attending MD: Enmanuel Cordoba MD   Total Sedation Time:   _______________________________________________________________________________       Procedure:           ERCP   Indications:         Cholecystitis, nonsurgical candidate at this time   Providers:           Enmanuel Cordoba MD   Patient Profile:     Ms Fatima is a delightful 64yo woman with liver disease                        who is found to have evidence of cholecystitis. As she                        is not a surgical candidate at this time she proceeds to                        management by ERCP for attempt at transpapillary                        gallbladder stent.   Referring MD:        Bernice Gonzales Md, MD   Requesting Provider: Raul Albarado MD, Sharmila Chaidez MD   Medicines:           General Anesthesia, Indomethacin 100 mg DE, Antibiotics                        as scheduled   Complications:       No immediate complications.   _______________________________________________________________________________   Procedure:           Pre-Anesthesia Assessment:                        - Prior to the procedure, a History and Physical was                        performed, and patient medications and allergies were                        reviewed. The patient is competent. The risks and                        benefits of the procedure and the sedation options and                        risks were discussed  with the patient. All questions                        were answered and informed consent was obtained. Patient                        identification and proposed procedure were verified by                        the nurse in the pre-procedure area. Mental Status                        Examination: alert and oriented. Airway Examination:                        Mallampati Class II (the uvula but not tonsillar pillars                        visualized). Respiratory Examination: clear to                        auscultation. CV Examination: normal. ASA Grade                        Assessment: III - A patient with severe systemic                        disease. After reviewing the risks and benefits, the                        patient was deemed in satisfactory condition to undergo                        the procedure. The anesthesia plan was to use general                        anesthesia. Immediately prior to administration of                        medications, the patient was re-assessed for adequacy to                        receive sedatives. The heart rate, respiratory rate,                        oxygen saturations, blood pressure, adequacy of                        pulmonary ventilation, and response to care were                        monitored throughout the procedure. The physical status                        of the patient was re-assessed after the procedure.                        After obtaining informed consent, the scope was passed                        under direct vision. Throughout the procedure, the                        patient's blood pressure, pulse, and oxygen saturations                        were monitored continuously. The duodenoscope was                        introduced through the mouth, and used to inject                        contrast into and used to inject contrast into the bile                        duct. The ERCP was accomplished without difficulty. The                        " patient tolerated the procedure well.                                                                                     Findings:        A  film of the right upper quadrant was unremarkable. Limited white        light views of the foregut were without overt lesion. The ampulla        appeared normal. The bile duct selectively was deeply cannulated with        the short-nosed traction sphincterotome in concert with an 0.025\"        Visiglide wire. Contrast was injected and I personally interpreted the        bile duct images. Ductal flow of contrast was adequate, image quality        was excellent and contrast extended to the intrahepatics. The        intrahepatics were diffusely mildly dilated though overall healthy        appearing in reasonable concentration. The bifurcation and common duct        were also minimally generous in caliber without stenosis or filling        defect. The cystic did not opacify, even under pressure injection with        an inflated balloon, rather was only first identified with the wire        movement. We then were able to verify the cystic and gallbladder with        directed injection, revealing cystic stones and cholelithiasis. With the        wire passed the cystic was dilated to 4mm with a Hurricane. A 6 mm        biliary sphincterotomy was made with a monofilament traction (standard)        sphincterotome using ERBE electrocautery. There was no        post-sphincterotomy bleeding. The main duct was swept recovering small        stone concretions. A 7 Fr by 15 cm transpapillary Compass stent with a        single external pigtail and a single internal pigtail was placed 15 cm        with internal curl into the gallbladder. Bile and some pus flowed        through the stent and t stent was in good position. The ventral        pancreatic duct and orifice were selectively not interrogated.                                                                                   "   Impression:          - Cystic duct obstructed by stones with cholelithiasis                        - Successful cystic dilation and placement of a 7F                        double pig tailed Compass transpapillary stent (pus                        drainage)                        - Uncomplicated bilairy sphincterotomy                        - Successful removal of stone concretion from the main   Recommendation:      - General anesthesia recovery with return to the floor                        when appropriate                        - Complete a course of antibiotic                        - Avoid antithrombotics for at least three days                        - 7F gallbladder stent may remain indefinitely with                        future ERCP timing (if any) to be determined by course                        - The findings and recommendations were discussed with                        the patient and their family                                                                                       electronically signed by JOMAR Cordoba

## 2019-10-01 NOTE — PLAN OF CARE
Patient C/O abdominal pain, given 5 mg of Oxycodone with relief.  Had total of 3 BMs; on scheduled Lactulose.  NS @ 125 ml/hr via PIV; on scheduled Zosyn.    Plan: OR tomorrow for ERCP with stent placement.

## 2019-10-01 NOTE — ANESTHESIA PREPROCEDURE EVALUATION
Anesthesia Pre-Procedure Evaluation    Patient: Hazel Fatima   MRN:     [id number] Gender:   female   Age:    63 year old :      1955        Preoperative Diagnosis: Cholecystitis [K81.9]   Procedure(s):  ENDOSCOPIC RETROGRADE CHOLANGIOPANCREATOGRAPHY     Past Medical History:   Diagnosis Date     Acute alcoholic hepatitis      Cirrhosis (H)      H/O migraine       Past Surgical History:   Procedure Laterality Date     ABDOMEN SURGERY      C Section      GYN SURGERY       and  C Section           Anesthesia Evaluation     .             ROS/MED HX    ENT/Pulmonary:     (+)tobacco use, Current use , . .    Neurologic:  - neg neurologic ROS   (+)migraines,     Cardiovascular:  - neg cardiovascular ROS       METS/Exercise Tolerance:     Hematologic:  - neg hematologic  ROS       Musculoskeletal:  - neg musculoskeletal ROS       GI/Hepatic:     (+) liver disease,       Renal/Genitourinary:  - ROS Renal section negative       Endo:     (+) Other Endocrine Disorder pre DM.      Psychiatric:  - neg psychiatric ROS       Infectious Disease:  - neg infectious disease ROS       Malignancy:      - no malignancy   Other:    - neg other ROS                     PHYSICAL EXAM:   Mental Status/Neuro: A/A/O   Airway: Facies: Feasible  Mallampati: I  Mouth/Opening: Full  TM distance: > 6 cm  Neck ROM: Full   Respiratory: Auscultation: CTAB     Resp. Rate: Normal     Resp. Effort: Normal      CV: Rhythm: Regular  Rate: Age appropriate  Heart: Normal Sounds  Edema: None   Comments:      Dental: Normal Dentition                LABS:  CBC:   Lab Results   Component Value Date    WBC 16.1 (H) 10/01/2019    WBC 13.3 (H) 2019    HGB 11.4 (L) 10/01/2019    HGB 11.1 (L) 2019    HCT 35.7 10/01/2019    HCT 34.0 (L) 2019     10/01/2019     2019     BMP:   Lab Results   Component Value Date     10/01/2019     2019    POTASSIUM 3.5 10/01/2019    POTASSIUM 3.8 2019     "CHLORIDE 106 10/01/2019    CHLORIDE 107 09/30/2019    CO2 24 10/01/2019    CO2 22 09/30/2019    BUN 8 10/01/2019    BUN 9 09/30/2019    CR 0.74 10/01/2019    CR 0.71 09/30/2019     (H) 10/01/2019    GLC 94 09/30/2019     COAGS:   Lab Results   Component Value Date    PTT 31 09/29/2019    INR 1.56 (H) 10/01/2019     POC:   Lab Results   Component Value Date    BGM 97 10/01/2019     OTHER:   Lab Results   Component Value Date    LACT 1.4 09/29/2019    TAI 8.5 10/01/2019    ALBUMIN 2.1 (L) 10/01/2019    PROTTOTAL 5.7 (L) 10/01/2019    ALT 14 10/01/2019    AST 25 10/01/2019    ALKPHOS 95 10/01/2019    BILITOTAL 2.7 (H) 10/01/2019    LIPASE 150 09/29/2019        Preop Vitals    BP Readings from Last 3 Encounters:   10/01/19 123/55   06/25/19 121/64    Pulse Readings from Last 3 Encounters:   10/01/19 81   06/25/19 58      Resp Readings from Last 3 Encounters:   10/01/19 20   06/25/19 18    SpO2 Readings from Last 3 Encounters:   10/01/19 100%      Temp Readings from Last 1 Encounters:   10/01/19 36.6  C (97.8  F) (Oral)    Ht Readings from Last 1 Encounters:   09/29/19 1.676 m (5' 6\")      Wt Readings from Last 1 Encounters:   09/29/19 64.2 kg (141 lb 9.6 oz)    Estimated body mass index is 22.85 kg/m  as calculated from the following:    Height as of this encounter: 1.676 m (5' 6\").    Weight as of this encounter: 64.2 kg (141 lb 9.6 oz).     LDA:  Peripheral IV 09/29/19 Left (Active)   Site Assessment WDL 10/1/2019  9:11 AM   Line Status Infusing 10/1/2019  9:11 AM   Phlebitis Scale 0-->no symptoms 10/1/2019  9:11 AM   Infiltration Scale 0 10/1/2019  9:11 AM   Infiltration Site Treatment Method  None 10/1/2019  9:11 AM   Extravasation? No 10/1/2019  9:11 AM   Number of days: 2       ETT (adult) (Active)   Number of days: 0        Assessment:   ASA SCORE: 2    H&P: History and physical reviewed and following examination; no interval change.     Smoking Status:  Active Smoker       - patient did not smoke on day " of surgery       - instructed to abstain from smoking on day of procedure   NPO Status: NPO Appropriate     Plan:   Anes. Type:  General   Pre-Medication: Midazolam   Induction:  IV (Standard)   Airway: ETT; Oral   Access/Monitoring: PIV   Maintenance: Balanced     Postop Plan:   Postop Pain: Opioids  Postop Sedation/Airway: Not planned  Disposition: Inpatient/Admit     PONV Management:   Adult Risk Factors: Female, Postop Opioids   Prevention: Ondansetron     CONSENT: Direct conversation   Plan and risks discussed with: Patient   Blood Products: Consent Deferred (Minimal Blood Loss)                   Analisa Marin MD

## 2019-10-01 NOTE — ANESTHESIA CARE TRANSFER NOTE
Patient: Hazel Fatima    Procedure(s):  ENDOSCOPIC RETROGRADE CHOLANGIOPANCREATOGRAPHY WITH BILIARY SPHINCTEROTOMY, STONE EXTRACTION AND GALLBLADDER STENT PLACEMENT    Diagnosis: Cholecystitis [K81.9]  Diagnosis Additional Information: No value filed.    Anesthesia Type:   General     Note:  Airway :Face Mask  Patient transferred to:PACU  Comments: VSS. Ventilating well.Handoff Report: Identifed the Patient, Identified the Reponsible Provider, Reviewed the pertinent medical history, Discussed the surgical course, Reviewed Intra-OP anesthesia mangement and issues during anesthesia, Set expectations for post-procedure period and Allowed opportunity for questions and acknowledgement of understanding      Vitals: (Last set prior to Anesthesia Care Transfer)    CRNA VITALS  10/1/2019 1113 - 10/1/2019 1154      10/1/2019             NIBP:  132/74    Ht Rate:  84    SpO2:  99 %    EKG:  Sinus rhythm                Electronically Signed By: KEYANA Austin CRNA  October 1, 2019  11:54 AM

## 2019-10-01 NOTE — PLAN OF CARE
Assumed care of Patient from 2000-0730. Patient A&Ox4. AVSS on room air. Abdominal pain managed with oxycodone. NPO for ERCP this AM. Zofran given for some nausea. Scheduled lactulose provided. Patient having frequent loose stools. [place] score of 0, no additional lactulose provided. Patient voiding with adequate urine output. PIV infusing IVMF with intermittent abx. UAL. Continue with POC.

## 2019-10-02 LAB
ALBUMIN SERPL-MCNC: 1.9 G/DL (ref 3.4–5)
ALP SERPL-CCNC: 93 U/L (ref 40–150)
ALT SERPL W P-5'-P-CCNC: 12 U/L (ref 0–50)
ANION GAP SERPL CALCULATED.3IONS-SCNC: 8 MMOL/L (ref 3–14)
AST SERPL W P-5'-P-CCNC: 24 U/L (ref 0–45)
BILIRUB SERPL-MCNC: 1.5 MG/DL (ref 0.2–1.3)
BUN SERPL-MCNC: 12 MG/DL (ref 7–30)
CALCIUM SERPL-MCNC: 8.4 MG/DL (ref 8.5–10.1)
CHLORIDE SERPL-SCNC: 109 MMOL/L (ref 94–109)
CO2 SERPL-SCNC: 22 MMOL/L (ref 20–32)
CREAT SERPL-MCNC: 0.99 MG/DL (ref 0.52–1.04)
ERYTHROCYTE [DISTWIDTH] IN BLOOD BY AUTOMATED COUNT: 15.7 % (ref 10–15)
GFR SERPL CREATININE-BSD FRML MDRD: 61 ML/MIN/{1.73_M2}
GLUCOSE SERPL-MCNC: 130 MG/DL (ref 70–99)
HCT VFR BLD AUTO: 33.4 % (ref 35–47)
HGB BLD-MCNC: 10.7 G/DL (ref 11.7–15.7)
INR PPP: 1.59 (ref 0.86–1.14)
LACTATE BLD-SCNC: 1.6 MMOL/L (ref 0.7–2)
MCH RBC QN AUTO: 31.6 PG (ref 26.5–33)
MCHC RBC AUTO-ENTMCNC: 32 G/DL (ref 31.5–36.5)
MCV RBC AUTO: 99 FL (ref 78–100)
PLATELET # BLD AUTO: 202 10E9/L (ref 150–450)
POTASSIUM SERPL-SCNC: 3.7 MMOL/L (ref 3.4–5.3)
PROT SERPL-MCNC: 5.5 G/DL (ref 6.8–8.8)
RBC # BLD AUTO: 3.39 10E12/L (ref 3.8–5.2)
SODIUM SERPL-SCNC: 139 MMOL/L (ref 133–144)
WBC # BLD AUTO: 11.9 10E9/L (ref 4–11)

## 2019-10-02 PROCEDURE — 25800030 ZZH RX IP 258 OP 636: Performed by: STUDENT IN AN ORGANIZED HEALTH CARE EDUCATION/TRAINING PROGRAM

## 2019-10-02 PROCEDURE — 99232 SBSQ HOSP IP/OBS MODERATE 35: CPT | Mod: GC | Performed by: INTERNAL MEDICINE

## 2019-10-02 PROCEDURE — 25000132 ZZH RX MED GY IP 250 OP 250 PS 637: Performed by: INTERNAL MEDICINE

## 2019-10-02 PROCEDURE — 80053 COMPREHEN METABOLIC PANEL: CPT | Performed by: STUDENT IN AN ORGANIZED HEALTH CARE EDUCATION/TRAINING PROGRAM

## 2019-10-02 PROCEDURE — 36415 COLL VENOUS BLD VENIPUNCTURE: CPT | Performed by: STUDENT IN AN ORGANIZED HEALTH CARE EDUCATION/TRAINING PROGRAM

## 2019-10-02 PROCEDURE — 85027 COMPLETE CBC AUTOMATED: CPT | Performed by: STUDENT IN AN ORGANIZED HEALTH CARE EDUCATION/TRAINING PROGRAM

## 2019-10-02 PROCEDURE — 83605 ASSAY OF LACTIC ACID: CPT | Performed by: STUDENT IN AN ORGANIZED HEALTH CARE EDUCATION/TRAINING PROGRAM

## 2019-10-02 PROCEDURE — 25000128 H RX IP 250 OP 636: Performed by: INTERNAL MEDICINE

## 2019-10-02 PROCEDURE — 12000001 ZZH R&B MED SURG/OB UMMC

## 2019-10-02 PROCEDURE — 85610 PROTHROMBIN TIME: CPT | Performed by: STUDENT IN AN ORGANIZED HEALTH CARE EDUCATION/TRAINING PROGRAM

## 2019-10-02 RX ORDER — LACTULOSE 10 G/15ML
30 SOLUTION ORAL 4 TIMES DAILY
Status: DISCONTINUED | OUTPATIENT
Start: 2019-10-02 | End: 2019-10-03 | Stop reason: HOSPADM

## 2019-10-02 RX ADMIN — FUROSEMIDE 20 MG: 20 TABLET ORAL at 19:26

## 2019-10-02 RX ADMIN — RIFAXIMIN 550 MG: 550 TABLET ORAL at 08:21

## 2019-10-02 RX ADMIN — PIPERACILLIN SODIUM AND TAZOBACTAM SODIUM 3.38 G: 3; .375 INJECTION, POWDER, LYOPHILIZED, FOR SOLUTION INTRAVENOUS at 20:44

## 2019-10-02 RX ADMIN — PIPERACILLIN SODIUM AND TAZOBACTAM SODIUM 3.38 G: 3; .375 INJECTION, POWDER, LYOPHILIZED, FOR SOLUTION INTRAVENOUS at 08:21

## 2019-10-02 RX ADMIN — SODIUM CHLORIDE, POTASSIUM CHLORIDE, SODIUM LACTATE AND CALCIUM CHLORIDE 500 ML: 600; 310; 30; 20 INJECTION, SOLUTION INTRAVENOUS at 13:21

## 2019-10-02 RX ADMIN — POTASSIUM CHLORIDE 10 MEQ: 750 TABLET, EXTENDED RELEASE ORAL at 08:21

## 2019-10-02 RX ADMIN — LACTULOSE 30 ML: 20 SOLUTION ORAL at 08:24

## 2019-10-02 RX ADMIN — OXYCODONE HYDROCHLORIDE 5 MG: 5 TABLET ORAL at 16:25

## 2019-10-02 RX ADMIN — PIPERACILLIN SODIUM AND TAZOBACTAM SODIUM 3.38 G: 3; .375 INJECTION, POWDER, LYOPHILIZED, FOR SOLUTION INTRAVENOUS at 01:57

## 2019-10-02 RX ADMIN — SPIRONOLACTONE 50 MG: 25 TABLET ORAL at 08:20

## 2019-10-02 RX ADMIN — PIPERACILLIN SODIUM AND TAZOBACTAM SODIUM 3.38 G: 3; .375 INJECTION, POWDER, LYOPHILIZED, FOR SOLUTION INTRAVENOUS at 15:07

## 2019-10-02 RX ADMIN — RIFAXIMIN 550 MG: 550 TABLET ORAL at 19:26

## 2019-10-02 RX ADMIN — OXYCODONE HYDROCHLORIDE 5 MG: 5 TABLET ORAL at 04:40

## 2019-10-02 RX ADMIN — OMEPRAZOLE 20 MG: 20 CAPSULE, DELAYED RELEASE ORAL at 08:20

## 2019-10-02 RX ADMIN — FUROSEMIDE 20 MG: 20 TABLET ORAL at 08:21

## 2019-10-02 RX ADMIN — SODIUM CHLORIDE, POTASSIUM CHLORIDE, SODIUM LACTATE AND CALCIUM CHLORIDE 500 ML: 600; 310; 30; 20 INJECTION, SOLUTION INTRAVENOUS at 19:32

## 2019-10-02 RX ADMIN — SPIRONOLACTONE 50 MG: 25 TABLET ORAL at 16:25

## 2019-10-02 RX ADMIN — OXYCODONE HYDROCHLORIDE 5 MG: 5 TABLET ORAL at 22:28

## 2019-10-02 RX ADMIN — OXYCODONE HYDROCHLORIDE 5 MG: 5 TABLET ORAL at 08:27

## 2019-10-02 ASSESSMENT — MIFFLIN-ST. JEOR: SCORE: 1278.93

## 2019-10-02 ASSESSMENT — PAIN DESCRIPTION - DESCRIPTORS
DESCRIPTORS: PRESSURE
DESCRIPTORS: PRESSURE;INTERMITTENT

## 2019-10-02 ASSESSMENT — ACTIVITIES OF DAILY LIVING (ADL)
ADLS_ACUITY_SCORE: 13
ADLS_ACUITY_SCORE: 11
ADLS_ACUITY_SCORE: 13

## 2019-10-02 NOTE — PLAN OF CARE
Alert and oriented x 4. Forgetful. Abdominal pain fully managed. Tolerating oral intake on regular diet. Bolus LR is infusing currently for soft BP. Ambulates in the room independently. Voiding and had 3 BM this shift. Plan: continue care.

## 2019-10-02 NOTE — PROGRESS NOTES
"         GASTROENTEROLOGY PROGRESS NOTE    ASSESSMENT:  63 year old female with a history of alcoholic cirrhosis c/b ascites, choledocholithiasis s/p ERCP 5/2019 with sphincterotomy and biliary swept and history of biliary colic with multiple ED visits. Now presented with RUQ pain and was found to have elevated WBC count of 11.5 and US showing hydropic gallbladder with cholelithiasis, mild wall thickening and trace pericholecystic fluid concern for acute cholecystitis.     #Acute cholecystitis s/p transpapillary cystic duct stent   History of biliary colic with multiple ED visits. Current pain more consistent with cholecystitis with elevated WBC count and imaging suggesting GB wall thickening with trace pericholecystic fluid. Surgery consulted and recommended no surgical intervention given her liver disease. S/p ERCP with transpapillary cystic duct stent placed with stone removal. Patient bili improving and no abdominal pain. Tolerating diet.   --Regular diet   --Finish antibiotic course 7 days   --No need to remove stent   --No follow up needed   --GI will sign off         #Alcoholic cirrhosis c/b ascites, HE  On lactulose for HE, trace ascites on US, no EV varices on last EGD (5/6/19) although portal hypertensive gastropathy. No HCC on prior imaging. Varices on prior imaging noted in the upper abdomen. MELD-Na 16. No signs of bleeding. Sober since 01/02/19  --Continue lactulose to aim 2-3 BM, daily   --Continue diuretics lasix 20 tid and spironolactone 50mg BID  --Continue rifaximin 550mg BID      The patient was discussed and plan agreed upon with GI staff.    Ezequiel Joshi MD  GI Fellow  _______________________________________________________________  S: Tolerating diet no abdominal pain, no bleeding     O:  Blood pressure 105/49, pulse 69, temperature 97.3  F (36.3  C), temperature source Oral, resp. rate 16, height 1.676 m (5' 6\"), weight 69.7 kg (153 lb 11.2 oz), SpO2 98 %.    Gen:alert and oriented " x3  HEENT: mucosa moist  CV: RRR  Lungs: Clear bilaterally   Abd: Non tender, no peritoneal signs   Skin: No jaundice   MS: moves all 4 ext  Neuro: no focal deficits  Psych: normal affect      LABS:  BMP  Recent Labs   Lab 10/02/19  0539 10/01/19  0656 09/30/19  0755 09/29/19  1006    138 138 135   POTASSIUM 3.7 3.5 3.8 4.0   CHLORIDE 109 106 107 101   TAI 8.4* 8.5 8.4* 9.0   CO2 22 24 22 25   BUN 12 8 9 9   CR 0.99 0.74 0.71 0.68   * 101* 94 127*     CBC  Recent Labs   Lab 10/02/19  0539 10/01/19  0656 09/30/19  0755 09/29/19  1048   WBC 11.9* 16.1* 13.3* 11.5*   RBC 3.39* 3.61* 3.48* 3.83   HGB 10.7* 11.4* 11.1* 12.2   HCT 33.4* 35.7 34.0* 36.2   MCV 99 99 98 95   MCH 31.6 31.6 31.9 31.9   MCHC 32.0 31.9 32.6 33.7   RDW 15.7* 15.6* 15.5* 15.4*    185 163 180     INR  Recent Labs   Lab 10/02/19  0539 10/01/19  0656 09/30/19  0755 09/29/19  1006   INR 1.59* 1.56* 1.36* 1.33*     LFTs  Recent Labs   Lab 10/02/19  0539 10/01/19  0656 09/30/19  0755 09/29/19  1006   ALKPHOS 93 95 82 112   AST 24 25 23 26   ALT 12 14 16 14   BILITOTAL 1.5* 2.7* 2.6* 2.4*   PROTTOTAL 5.5* 5.7* 5.3* 6.1*   ALBUMIN 1.9* 2.1* 1.9* 2.2*      PANC  Recent Labs   Lab 09/29/19  1006   LIPASE 150

## 2019-10-02 NOTE — PLAN OF CARE
"BP 91/48 (BP Location: Right arm)   Pulse 69   Temp 97  F (36.1  C) (Oral)   Resp 13   Ht 1.676 m (5' 6\")   Wt 64.2 kg (141 lb 9.6 oz)   SpO2 94%   BMI 22.85 kg/m      Shift: [id number]  Reason for Admission: RUQ abdominal pain with nausea.  VS: BP low (see flowsheets), pt asymptomatic MD notified, BP re-checked, MD notified, continue to monitor closely. Capno on  Neuros: A/Ox4, Scoring 0 on [place] protocol   GI/: 2 BMs this shift- on lactulose. Voiding adequately   Diet: Tolerating FLD, ate tomato soup  Drains/Lines: IV antibiotic, SL b/w?  Activity: SBA  Pain/Nausea: C/o of abdominal pain- PRN Oxycodone given. C/o nausea, zofran given   Respiratory/Trach: Sats > 93% on RA ?   New this shift: ERCP today- stent placed with bilairy sphincterotomy and removal of stones  Plan of care: Will continue to monitor and follow POC.   "

## 2019-10-02 NOTE — PLAN OF CARE
Temp: 96.5  F (35.8  C) Temp src: Oral BP: 102/47  Heart Rate: 82 Resp: 14 SpO2: 94 % O2 Device: None (Room air)     Shift: [id number]  Activity: Up independently  Neuros: A&O x4, scoring 0 on [place] protocol   Cardiac: BP soft, within parameters  Respiratory: Denies sob, sats mid 90s on RA, capno in place  GI/: Voiding spontaneously, BM x3 this shift   Diet: Fulls, ordered breakfast  Lines: L PIV SL, abx per orders  Pain/Nausea: Abdominal pain controlled with PRN Oxy x1. Denies nausea  Plan: Continue with poc

## 2019-10-03 ENCOUNTER — PATIENT OUTREACH (OUTPATIENT)
Dept: CARE COORDINATION | Facility: CLINIC | Age: 64
End: 2019-10-03

## 2019-10-03 VITALS
OXYGEN SATURATION: 98 % | BODY MASS INDEX: 24.7 KG/M2 | HEART RATE: 68 BPM | SYSTOLIC BLOOD PRESSURE: 106 MMHG | WEIGHT: 153.7 LBS | HEIGHT: 66 IN | TEMPERATURE: 97.1 F | DIASTOLIC BLOOD PRESSURE: 52 MMHG | RESPIRATION RATE: 18 BRPM

## 2019-10-03 LAB
ALBUMIN SERPL-MCNC: 1.8 G/DL (ref 3.4–5)
ALP SERPL-CCNC: 101 U/L (ref 40–150)
ALT SERPL W P-5'-P-CCNC: 14 U/L (ref 0–50)
AMMONIA PLAS-SCNC: 11 UMOL/L (ref 10–50)
ANION GAP SERPL CALCULATED.3IONS-SCNC: 7 MMOL/L (ref 3–14)
AST SERPL W P-5'-P-CCNC: 28 U/L (ref 0–45)
BILIRUB SERPL-MCNC: 1.3 MG/DL (ref 0.2–1.3)
BUN SERPL-MCNC: 4 MG/DL (ref 7–30)
CALCIUM SERPL-MCNC: 8.3 MG/DL (ref 8.5–10.1)
CHLORIDE SERPL-SCNC: 108 MMOL/L (ref 94–109)
CO2 SERPL-SCNC: 23 MMOL/L (ref 20–32)
CREAT SERPL-MCNC: 0.72 MG/DL (ref 0.52–1.04)
ERYTHROCYTE [DISTWIDTH] IN BLOOD BY AUTOMATED COUNT: 15.9 % (ref 10–15)
GFR SERPL CREATININE-BSD FRML MDRD: 90 ML/MIN/{1.73_M2}
GLUCOSE BLDC GLUCOMTR-MCNC: 101 MG/DL (ref 70–99)
GLUCOSE SERPL-MCNC: 109 MG/DL (ref 70–99)
HCT VFR BLD AUTO: 32.9 % (ref 35–47)
HGB BLD-MCNC: 10.7 G/DL (ref 11.7–15.7)
MAGNESIUM SERPL-MCNC: 1.8 MG/DL (ref 1.6–2.3)
MCH RBC QN AUTO: 31.7 PG (ref 26.5–33)
MCHC RBC AUTO-ENTMCNC: 32.5 G/DL (ref 31.5–36.5)
MCV RBC AUTO: 97 FL (ref 78–100)
PLATELET # BLD AUTO: 202 10E9/L (ref 150–450)
POTASSIUM SERPL-SCNC: 3.3 MMOL/L (ref 3.4–5.3)
PROT SERPL-MCNC: 5.1 G/DL (ref 6.8–8.8)
RBC # BLD AUTO: 3.38 10E12/L (ref 3.8–5.2)
SODIUM SERPL-SCNC: 138 MMOL/L (ref 133–144)
WBC # BLD AUTO: 8.4 10E9/L (ref 4–11)

## 2019-10-03 PROCEDURE — 80053 COMPREHEN METABOLIC PANEL: CPT | Performed by: STUDENT IN AN ORGANIZED HEALTH CARE EDUCATION/TRAINING PROGRAM

## 2019-10-03 PROCEDURE — 82140 ASSAY OF AMMONIA: CPT | Performed by: STUDENT IN AN ORGANIZED HEALTH CARE EDUCATION/TRAINING PROGRAM

## 2019-10-03 PROCEDURE — 00000146 ZZHCL STATISTIC GLUCOSE BY METER IP

## 2019-10-03 PROCEDURE — 40000556 ZZH STATISTIC PERIPHERAL IV START W US GUIDANCE

## 2019-10-03 PROCEDURE — 85027 COMPLETE CBC AUTOMATED: CPT | Performed by: STUDENT IN AN ORGANIZED HEALTH CARE EDUCATION/TRAINING PROGRAM

## 2019-10-03 PROCEDURE — 25000132 ZZH RX MED GY IP 250 OP 250 PS 637: Performed by: INTERNAL MEDICINE

## 2019-10-03 PROCEDURE — 25000132 ZZH RX MED GY IP 250 OP 250 PS 637: Performed by: STUDENT IN AN ORGANIZED HEALTH CARE EDUCATION/TRAINING PROGRAM

## 2019-10-03 PROCEDURE — 99238 HOSP IP/OBS DSCHRG MGMT 30/<: CPT | Mod: GC | Performed by: INTERNAL MEDICINE

## 2019-10-03 PROCEDURE — 25000128 H RX IP 250 OP 636: Performed by: INTERNAL MEDICINE

## 2019-10-03 PROCEDURE — 83735 ASSAY OF MAGNESIUM: CPT | Performed by: STUDENT IN AN ORGANIZED HEALTH CARE EDUCATION/TRAINING PROGRAM

## 2019-10-03 PROCEDURE — 36415 COLL VENOUS BLD VENIPUNCTURE: CPT | Performed by: STUDENT IN AN ORGANIZED HEALTH CARE EDUCATION/TRAINING PROGRAM

## 2019-10-03 RX ORDER — FUROSEMIDE 20 MG
20 TABLET ORAL 3 TIMES DAILY
Qty: 90 TABLET | Refills: 0 | Status: SHIPPED | OUTPATIENT
Start: 2019-10-03

## 2019-10-03 RX ORDER — OXYCODONE HYDROCHLORIDE 5 MG/1
5 TABLET ORAL EVERY 4 HOURS PRN
Qty: 12 TABLET | Refills: 0 | Status: SHIPPED | OUTPATIENT
Start: 2019-10-03

## 2019-10-03 RX ADMIN — POTASSIUM CHLORIDE 10 MEQ: 750 TABLET, EXTENDED RELEASE ORAL at 09:26

## 2019-10-03 RX ADMIN — PIPERACILLIN SODIUM AND TAZOBACTAM SODIUM 3.38 G: 3; .375 INJECTION, POWDER, LYOPHILIZED, FOR SOLUTION INTRAVENOUS at 01:28

## 2019-10-03 RX ADMIN — OXYCODONE HYDROCHLORIDE 5 MG: 5 TABLET ORAL at 09:33

## 2019-10-03 RX ADMIN — LACTULOSE 30 ML: 20 SOLUTION ORAL at 09:25

## 2019-10-03 RX ADMIN — OXYCODONE HYDROCHLORIDE 5 MG: 5 TABLET ORAL at 02:15

## 2019-10-03 RX ADMIN — OMEPRAZOLE 20 MG: 20 CAPSULE, DELAYED RELEASE ORAL at 09:25

## 2019-10-03 RX ADMIN — PIPERACILLIN SODIUM AND TAZOBACTAM SODIUM 3.38 G: 3; .375 INJECTION, POWDER, LYOPHILIZED, FOR SOLUTION INTRAVENOUS at 09:24

## 2019-10-03 RX ADMIN — SPIRONOLACTONE 50 MG: 25 TABLET ORAL at 09:25

## 2019-10-03 RX ADMIN — RIFAXIMIN 550 MG: 550 TABLET ORAL at 09:26

## 2019-10-03 ASSESSMENT — ACTIVITIES OF DAILY LIVING (ADL)
ADLS_ACUITY_SCORE: 13

## 2019-10-03 NOTE — PROGRESS NOTES
[facility] Daily Progress Note    Hazel Fatima is a 63 year old female admitted on 9/29/2019. 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    Interval History/Subjective:  Feels better this AM. Pain 6/10 with oxycodone. Is having increased BMs.   - soft Bps. Fluid responsive to 5oo ml bolus of IVF x2 today 10/2  - Set hold parameters on lactulose and discontinued PRN  - s/p ERCP on 10/1    Objective    Physical Exam:   Temp:  [96.5  F (35.8  C)-97.8  F (36.6  C)] 97.3  F (36.3  C)  Pulse:  [69] 69  Heart Rate:  [67-82] 75  Resp:  [11-16] 16  BP: ()/(43-58) 107/53  SpO2:  [94 %-100 %] 100 %    Constitutional: NAD, A&Ox2 (thinks we are in a hospital in [facility])  HEENT: Normocephalic, PERRL, EOMI, anicteric sclera  Cardiovascular: RRR. No edema or JVD.  Respiratory: CTAB No wheezes  Gastrointestinal: Abdomen soft, slight RUQ tenderness, BS normal. No peritoneal signs, no rebound.  Musculoskeletal: Extremities normal with no gross deformities noted, normal muscle tone, peripheral pulses normal and normal ROM.   Skin: No suspicious lesions or rashes  Neurologic: no focal neurological deficits    Labs  CMP  Recent Labs   Lab 10/02/19  0539 10/01/19  0656 09/30/19  0755 09/29/19  1006    138 138 135   POTASSIUM 3.7 3.5 3.8 4.0   CHLORIDE 109 106 107 101   CO2 22 24 22 25   ANIONGAP 8 9 9 9   * 101* 94 127*   BUN 12 8 9 9   CR 0.99 0.74 0.71 0.68   GFRESTIMATED 61 88 >90 >90   GFRESTBLACK 71 >90 >90 >90   TAI 8.4* 8.5 8.4* 9.0   PROTTOTAL 5.5* 5.7* 5.3* 6.1*   ALBUMIN 1.9* 2.1* 1.9* 2.2*   BILITOTAL 1.5* 2.7* 2.6* 2.4*   ALKPHOS 93 95 82 112   AST 24 25 23 26   ALT 12 14 16 14     CBC  Recent Labs   Lab 10/02/19  0539 10/01/19  0656 09/30/19  0755 09/29/19  1048   WBC 11.9* 16.1* 13.3* 11.5*   RBC 3.39* 3.61* 3.48* 3.83   HGB 10.7*  11.4* 11.1* 12.2   HCT 33.4* 35.7 34.0* 36.2   MCV 99 99 98 95   MCH 31.6 31.6 31.9 31.9   MCHC 32.0 31.9 32.6 33.7   RDW 15.7* 15.6* 15.5* 15.4*    185 163 180     INR  Recent Labs   Lab 10/02/19  0539 10/01/19  0656 09/30/19  0755 09/29/19  1006   INR 1.59* 1.56* 1.36* 1.33*     Imaging  ERCP 10/1:   - Cystic duct obstructed by stones with cholelithiasis   - Successful cystic dilation and placement of a 7F double pig tailed Compass transpapillary stent (pus drainage)   - Uncomplicated bilairy sphincterotomy   - Successful removal of stone concretion from the main     US RUQ 9/29: IMPRESSION:  1.  Hydropic gallbladder with cholelithiasis, mild wall thickening,  and trace flow in the gallbladder wall. Consider nuclear medicine  hepatobiliary scan to evaluate for acute cholecystitis.  2.  Cirrhotic configuration of the liver with prominent varices in the  right mid abdomen beneath the liver. Trace ascites.    Assessment and Plan:   Hazel Fatima is a 63 year old female admitted on 9/29/2019. 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      Abdominal Pain  Cholelithiasis  Cholecystitis  Patient with worsening RUQ pain radiating around abdomen and back. Seen in ED by surgery, risks outweighs benefits at this time for cholecystectomy. GI consulted for alternative interventions to relieve biliary sludge. ERCP 10/1 which found obstructed cystic duct by stones with cholelithiasis s/p transpapillary stent.   - GI signed off on 10/2   - Complete 7d course of antibiotics, consider de-escalation to Amox/Clav 10/3   - no need to remove stent, no follow up needed per GI  - Advance diet as tolerated   - Discontinued IVFs   - Oxycodone 5mg q4h PRN  - Ondansetron 4mg q6h PRN     ESLD--MELD-Na 15 on admission  Constipation - resolved   Loose stools, ongoing  Patient reports no bowel movements since last week upon admission. No  AMS or asterixis on exam. Constipation progressed to loose stools with lactulose.   - PTA lactulose 30mL QID titrate to 3-5 bowel movements per day. Holding with additional lactulose 30mL q2h PRN.   - Continue PTA spironolactone, furosemide, rifaximin  - Daily MELD labs  - GI cosult as above    Hypotension   Likely 2/2 being NPO for procedure. Less likely 2/2 infection, afebrile, no leukocytosis, on IV zozyn. Fluid responsive s/p 2x 500cc LR boluses 10/2.  - monitor  - s/p 500 ml LR x2     Tobacco use  Discussed smoking cessation. Smokes 10 cigarettes per day.  - Nicotine replacement patches and lozenges PRN     Diet: Advance as tolerated   Fluids: None   DVT Prophylaxis: Pneumatic Compression Devices  Murphy Catheter: not present  Code Status: FULL    Patient seen and discussed with Dr. Torres.     Terri Piña DO  PGY2 Internal Medicine   [phone]

## 2019-10-03 NOTE — PLAN OF CARE
Vital signs:  Temp: 97.7  F (36.5  C) Temp src: Oral BP: 120/58   Heart Rate: 72 Resp: 16 SpO2: 98 % O2 Device: None (Room air)     Activity: Up independently.   Neuros: A&O x4. Marsing score of 0.  Cardiac: WDL.   Respiratory: WDL on RA. Denies SOB.   GI/: Voiding adequate amounts. +BS, passing flatus, 1 BM overnight.   Diet: Regular.   Lines: New left PIV placed, SL. Zosyn given per orders.  Pain/nausea: Oxy given x1. Hot packs to arms. Denies nausea.   Plan: Possible discharge today.

## 2019-10-03 NOTE — PLAN OF CARE
HD4 admitted with abd. Pain. S/P ERCP with stent placement. A&Ox4, VSS on room air. Pain is controlled with PO oxycodone and tylenol. Up independent, PIV is SL between antibiotics, Strict I/O, 4 BMs today, no s/s of HE, held lactulose. LSC, BS+. 2 boluses of LR given today for low BP. Possible discharge tomorrow.

## 2019-10-03 NOTE — PLAN OF CARE
Alert and oriented x 4. Vital signs stable. On room air. Denies pain.good oral intake.Ambulates independently. No complaints. Intervention/Assessment: Removed iv line. Reviewed discharge instruction. Discharged left the unit by wheelchair. Had all her belongings such as cell phone,  with her.

## 2019-10-04 NOTE — DISCHARGE SUMMARY
[facility], [place]  Discharge Summary - Medicine & Pediatrics       Date of Admission:  9/29/2019  Date of Discharge:  10/3/2019  2:52 PM  Discharging Provider: Dr. Torres  Discharge Service: Hugo 1    Discharge Diagnoses   Acute abdominal pain secondary to cholelithiasis   Elevated TBili  End stage liver disease  Hypotension  Tobacco use     Follow-ups Needed After Discharge   Follow-up Appointments     Adult [facility] Follow-up and recommended labs and tests      Follow up with Dr. Dorina Licona (GI doctor) , at [facility], within   1-2 weeks of discharge. Patient will call to make appointment.      Follow up with primary care provider, Bernice Gonzales MD, within 7   days to evaluate medication change, to evaluate treatment change, to   evaluate after surgery and for hospital follow- up.  The following   labs/tests are recommended: CMP, CBC.      Appointments on [place] and/or [facility] (with [facility] or [facility]   provider or service). Call [phone] if you haven't heard regarding   these appointments within 7 days of discharge.             Unresulted Labs Ordered in the Past 30 Days of this Admission     Date and Time Order Name Status Description    9/29/2019 1618 Blood Culture ONE site Preliminary       These results will be followed up by PCP    Discharge Disposition   Discharged to home  Condition at discharge: Stable    Hospital Course   Hazel Fatima was admitted on 9/29/2019 for acute abdominal pain secondary to cholelithiasis.  The following problems were addressed during her hospitalization:    Acute abdominal pain secondary to acute cholelithiasis  Elevated TBili  The patient presented to the ED with acute RUQ pain radiating around abdomen and back. She was found to have elevated WBC to 11.5 and elevated Tbili to 2.4. She was started on IV Zosyn. She had a RUQ ultrasound that showed hydropic gallbladder with cholelithiasis and mild wall thickening.  She was evaluated by surgery in the ED where it was decided that the risks outweighed the benefits of performing a cholecystectomy. Therefore, GI was consulted for alternative interventions. The patient underwent ERCP (10/1) which found that she had an obstructed cystic duct by stones with cholelithiasis. She had a transpapillary stent placed. She was discharged on Amoxicillin-clavulanic acid. She will follow-up with GI for further evaluation in 2 weeks.     ESLD  Constipation  Patient's MELD 15 on admission. Patient reported no bowel movements for a week on day of admission. She had no altered mental status or asterixis on exam upon admission. She was started on scheduled and prn lactulose with resolution of constipation.     Hypotension  On the day prior to discharge, the patient was hypotensive. She received 2x 500mL LR boluses with resolution of hypotension. Suspect hypotension was secondary to procedure and GI losses.     Tobacco use  Encouraged smoking cessation during hospital stay.     Consultations This Hospital Stay   GI PANCREATICOBILIARY ADULT IP CONSULT  PHYSICAL THERAPY ADULT IP CONSULT  OCCUPATIONAL THERAPY ADULT IP CONSULT  GI PANCREATICOBILIARY ADULT IP CONSULT  MEDICATION HISTORY IP PHARMACY CONSULT  VASCULAR ACCESS CARE ADULT IP CONSULT  VASCULAR ACCESS CARE ADULT IP CONSULT    Code Status   Full Code     The patient was discussed with DO Hugo Martinez 1 Service  [facility], [place]  Pager: [phone]  ______________________________________________________________________    Physical Exam   Vital Signs:                    Weight: 153 lbs 11.2 oz    Primary Care Physician   Bernice Gonzales MD    Discharge Orders      Reason for your hospital stay    You were admitted for gallstones and had a procedure (ERCP) with a stent placement.  1. Please follow up with your primary care doctor in 2-3 weeks. You will need labs: CMP  2. Please  follow up with your GI Dr.  Within 1-2 weeks  3. Please take you oral antibiotic as prescribed.     Adult [facility] Follow-up and recommended labs and tests    Follow up with Dr. Dorina Licona (GI doctor) , at [facility], within 1-2 weeks of discharge. Patient will call to make appointment.      Follow up with primary care provider, Bernice Gonzales MD, within 7 days to evaluate medication change, to evaluate treatment change, to evaluate after surgery and for hospital follow- up.  The following labs/tests are recommended: CMP, CBC.      Appointments on [place] and/or [facility] (with [facility] or [facility] provider or service). Call [phone] if you haven't heard regarding these appointments within 7 days of discharge.     Activity    Your activity upon discharge: activity as tolerated     Full Code     Diet    Follow this diet upon discharge: Regular       Significant Results and Procedures   Most Recent 3 CBC's:  Recent Labs   Lab Test 10/03/19  0705 10/02/19  0539 10/01/19  0656   WBC 8.4 11.9* 16.1*   HGB 10.7* 10.7* 11.4*   MCV 97 99 99    202 185     Most Recent 3 BMP's:  Recent Labs   Lab Test 10/03/19  0705 10/02/19  0539 10/01/19  0656    139 138   POTASSIUM 3.3* 3.7 3.5   CHLORIDE 108 109 106   CO2 23 22 24   BUN 4* 12 8   CR 0.72 0.99 0.74   ANIONGAP 7 8 9   TAI 8.3* 8.4* 8.5   * 130* 101*     Most Recent 2 LFT's:  Recent Labs   Lab Test 10/03/19  0705 10/02/19  0539   AST 28 24   ALT 14 12   ALKPHOS 101 93   BILITOTAL 1.3 1.5*     Most Recent 3 INR's:  Recent Labs   Lab Test 10/02/19  0539 10/01/19  0656 09/30/19  0755   INR 1.59* 1.56* 1.36*       Discharge Medications   Discharge Medication List as of 10/3/2019  2:24 PM      START taking these medications    Details   amoxicillin-clavulanate (AUGMENTIN) 875-125 MG tablet Take 1 tablet by mouth 2 times daily for 2 days, Disp-4 tablet, R-0, Local Print      oxyCODONE (ROXICODONE) 5 MG tablet Take 1 tablet (5 mg) by mouth  every 4 hours as needed for moderate to severe pain, Disp-12 tablet, R-0, Local Print      rifaximin (XIFAXAN) 550 MG TABS tablet Take 1 tablet (550 mg) by mouth 2 times daily, Disp-60 tablet, R-0, Local Print         CONTINUE these medications which have CHANGED    Details   furosemide (LASIX) 20 MG tablet Take 1 tablet (20 mg) by mouth 3 times daily, Disp-90 tablet, R-0, Local Print         CONTINUE these medications which have NOT CHANGED    Details   acetaminophen (TYLENOL) 325 MG tablet Take 325 mg by mouth every 6 hours as needed for pain, Historical      lactulose (CHRONULAC) 10 GM/15ML solution Take 30 mLs by mouth 3 times daily , Historical      omeprazole (PRILOSEC) 20 MG DR capsule Take 20 mg by mouth daily . Take 1 hour before a meal., Historical      ondansetron (ZOFRAN-ODT) 4 MG ODT tab Take 4 mg by mouth every 6 hours as needed for nausea or vomiting , Historical      potassium chloride ER (MICRO-K) 10 MEQ CR capsule Take 10 mEq by mouth every evening , Historical      spironolactone (ALDACTONE) 50 MG tablet Take 50 mg by mouth 2 times daily , R-3, Historical           Allergies   No Known Allergies

## 2019-10-04 NOTE — PROGRESS NOTES
Patient was called three times and no answer so post 24 hr DC follow up calls will be closed out, message was left with contact number for department seen by or following up     Adult [facility] Follow-up and recommended labs and tests  Follow up with Dr. Dorina Licona (GI doctor) , at [facility], within 1-2 weeks of discharge. Patient will call to make  appointment.  Follow up with primary care provider, Bernice Gonzales MD, within 7 days to evaluate medication change, to  evaluate treatment change, to evaluate after surgery and for hospital follow- up. The following labs/tests are  recommended: CMP, CBC.  Appointments on [place] and/or [facility] (with [facility] or [facility] provider or service). Call [phone] if you  haven't heard regarding these appointments within 7 days of discharge.

## 2019-10-05 LAB
BACTERIA SPEC CULT: NO GROWTH
Lab: NORMAL
SPECIMEN SOURCE: NORMAL

## 2020-03-11 ENCOUNTER — HEALTH MAINTENANCE LETTER (OUTPATIENT)
Age: 65
End: 2020-03-11

## 2020-05-11 ENCOUNTER — TELEPHONE (OUTPATIENT)
Dept: GASTROENTEROLOGY | Facility: CLINIC | Age: 65
End: 2020-05-11

## 2020-05-11 NOTE — TELEPHONE ENCOUNTER
[facility] Call Center    Phone Message    May a detailed message be left on voicemail: yes     Reason for Call: Other: Dr. Cordoba placed a stent last year 10/2019 and Saturday 5/9 the stent came out. Per pt no additional symptoms accept fatigue. Pt asking for a call back to discuss.     Action Taken: Message routed to:  [facility] ([facility]): terell phan     Travel Screening: Not Applicable

## 2020-05-12 NOTE — TELEPHONE ENCOUNTER
"Patient says she was wiping after BM, felt stent, found it and goolged it, knows its her GB stent that came out.     Otherwise feeling ok, but is noting increased \"burning\", sort of like heartburn but worse, similar to before she had procedure done. Not in severe pain now but is worried about it returning.     Inteterval MRI done at [facility]. Read in [place]  IMPRESSION:    1. No solid or enhancing pancreatic mass, and no distention of the main pancreatic duct. A 0.8 cm cystic lesion at the pancreatic neck/body junction most likely represents a sidebranch IPMN. See below for follow-up recommendations.    2. Cirrhotic liver with portosystemic collateralization.    3. Cholelithiasis without cholecystitis.    4. Prominent upper abdominal and retroperitoneal lymph nodes, nonspecific but most likely reactive in a patient with chronic liver disease.    Will query Dr. Cordoba for next steps.   "

## 2020-05-12 NOTE — TELEPHONE ENCOUNTER
Patient noting GB stent placed  came out, unsure how she discovered this.     Left message with clinic info for her to call back.    Terri Reddy, RN Care Coordinator

## 2020-05-13 ENCOUNTER — PATIENT OUTREACH (OUTPATIENT)
Dept: GASTROENTEROLOGY | Facility: CLINIC | Age: 65
End: 2020-05-13

## 2020-05-13 DIAGNOSIS — R10.13 DYSPEPSIA: Primary | ICD-10-CM

## 2020-05-13 NOTE — PROGRESS NOTES
Per Dr. Cordoba  I would trial a PPI if she is not already one on   Either way lets plan a virtual visit to discuss further     Per patient, not taking PPI. Reordered Prilosec, pharmacy confirmed.   Video visit scheduled 5/28, with Hussain

## 2020-05-28 ENCOUNTER — VIRTUAL VISIT (OUTPATIENT)
Dept: GASTROENTEROLOGY | Facility: CLINIC | Age: 65
End: 2020-05-28
Payer: COMMERCIAL

## 2020-05-28 VITALS — HEIGHT: 66 IN | WEIGHT: 140 LBS | BODY MASS INDEX: 22.5 KG/M2

## 2020-05-28 DIAGNOSIS — K86.2 PANCREATIC CYST: ICD-10-CM

## 2020-05-28 DIAGNOSIS — K21.9 GASTROESOPHAGEAL REFLUX DISEASE, ESOPHAGITIS PRESENCE NOT SPECIFIED: Primary | ICD-10-CM

## 2020-05-28 RX ORDER — INSULIN GLARGINE 100 [IU]/ML
INJECTION, SOLUTION SUBCUTANEOUS
COMMUNITY
Start: 2020-04-14

## 2020-05-28 RX ORDER — LANCETS 30 GAUGE
1 EACH MISCELLANEOUS
COMMUNITY
Start: 2019-11-20

## 2020-05-28 RX ORDER — GLUCAGON 3 MG/1
POWDER NASAL
COMMUNITY
Start: 2020-02-11

## 2020-05-28 RX ORDER — PEN NEEDLE, DIABETIC 32GX 5/32"
NEEDLE, DISPOSABLE MISCELLANEOUS
COMMUNITY
Start: 2020-01-21

## 2020-05-28 RX ORDER — BLOOD SUGAR DIAGNOSTIC
STRIP MISCELLANEOUS
COMMUNITY
Start: 2020-05-03

## 2020-05-28 RX ORDER — PROCHLORPERAZINE 25 MG/1
SUPPOSITORY RECTAL
COMMUNITY
Start: 2020-05-10

## 2020-05-28 RX ORDER — CONTAINER,EMPTY
EACH MISCELLANEOUS
COMMUNITY
Start: 2019-11-20

## 2020-05-28 RX ORDER — THIAMINE HCL 100 MG
TABLET ORAL
COMMUNITY
Start: 2020-04-17

## 2020-05-28 RX ORDER — INSULIN LISPRO 100 [IU]/ML
INJECTION, SOLUTION INTRAVENOUS; SUBCUTANEOUS
COMMUNITY
Start: 2020-05-15

## 2020-05-28 RX ORDER — OMEPRAZOLE 40 MG/1
40 CAPSULE, DELAYED RELEASE ORAL
Qty: 60 CAPSULE | Refills: 0 | Status: SHIPPED | OUTPATIENT
Start: 2020-05-28 | End: 2020-06-24 | Stop reason: ALTCHOICE

## 2020-05-28 ASSESSMENT — MIFFLIN-ST. JEOR: SCORE: 1211.79

## 2020-05-28 ASSESSMENT — PAIN SCALES - GENERAL: PAINLEVEL: NO PAIN (0)

## 2020-05-28 NOTE — PATIENT INSTRUCTIONS
Follow up:    Dr. Cordoba is recommending the following next steps after your clinic visit:    lncrease her dose and frequency of her omeprazole, to 40mg twice daily. Let us know if you need more refills.    If symptoms improve, we will need to dive deeper into lifestyle and eating habits.     Without improvement, she will return to her low dose of 20mg once daily and we will obtain an interval RUQ US to further investigate recurrence of cholecystitis.    With evidence of recurrence OR progressive symptoms of RUQ pain, we will schedule another  ERCP for transpapillary gallbladder stent placement. Please call our office with updates related to how your are doing related to your symptoms.     MRI/MRCP in one year for pancreatic surveillance. Orders have been placed, we will reach out to remind you closer to the date of annual imaging.     Please call with any questions or concerns regarding your clinic visit today.    It is a pleasure being involved in your health care.    Contacts post-consultation depending on your need:    Schedule Clinic Appointments            [phone] # 1   M-F 7:30 - 5 pm    Terri Reddy RN Care Coordinator  [phone]       OR Procedure Scheduling                             [phone]    My Chart is available 24 hours a day and is a secure way to access your records and communicate with your care team.  I strongly recommend signing up if you haven't already done so, if you are comfortable with computers.  If you would like to inquire about this or are having problems with My Chart access, you may call [phone] or go online at [email or web address].  Please allow at least 24 hours for a response and extra time on weekends and Holidays.

## 2020-05-28 NOTE — LETTER
5/28/2020       RE: Hazel Fatima  [address]  [facility] [address]        Dear Colleague,    Thank you for referring your patient, Hazel Fatima, to the [facility]. Please see a copy of my visit note below.    Hazel Fatima is a 64 year old female who is being evaluated via a billable video visit.      Video-Visit Details    Type of service:  Video Visit    Video Start Time: 0745  Video End Time: 0800    Originating Location (pt. Location): Home    Distant Location (provider location):  [facility]     Platform used for Video Visit: Socialance     Referring provider: Bernice Gonzales  Query: History of cholecystitis managed endoscopically    HPI  Ms Fatima is a 63yo woman with chronic liver disease (MELD near 15 during recent admission) thought from drinking who presented in September with evidence of cholecystitis. She was deemed a nonsurgical candidate so we performed an ERCP with sphincterotomy and placement of a transpapillary gallbladder stent. During this procedure it was clear the gallbladder was infected (drained pus) and she was also found to have choledocholithiasis. She noted passage of the stent in her stool and she reached out to our group for follow up. She had an interval MRI which demonstrated a subcentimeter cystic lesion of the pancreatic body with concerning features and no evidence of cholecystitis. She also had a cirrhotic liver and reactive nodes.    She had significant RUQ/right flank at the time which resolved after the procedure. Since she past the stent she again has heartburn, with near nausea. It is not quite as severe as before the procedure. She does note only mild pain as compared to previous. She has not had fevers. She takes low dose omeprazole in the morning with minimal improvement.     Medications  Current Outpatient Medications   Medication     acetaminophen (TYLENOL) 325 MG tablet     acetone urine (KETOSTIX) test strip      BAQSIMI ONE PACK 3 MG/DOSE POWD     Continuous Blood Gluc Sensor (DEXCOM G6 SENSOR) MISC     furosemide (LASIX) 20 MG tablet     HUMALOG KWIKPEN 100 UNIT/ML soln     insulin glargine (LANTUS SOLOSTAR) 100 UNIT/ML pen     insulin pen needle (BD JUAN U/F) 32G X 4 MM miscellaneous     lactulose (CHRONULAC) 10 GM/15ML solution     omeprazole (PRILOSEC) 20 MG DR capsule     ondansetron (ZOFRAN-ODT) 4 MG ODT tab     OneTouch Delica Lancets 30G MISC     oxyCODONE (ROXICODONE) 5 MG tablet     potassium chloride ER (MICRO-K) 10 MEQ CR capsule     Sharps Container (BD SHARPS ) MISC     spironolactone (ALDACTONE) 50 MG tablet     VITAMIN B1 100 MG tablet     ONETOUCH VERIO IQ test strip     rifaximin (XIFAXAN) 550 MG TABS tablet     No current facility-administered medications for this visit.      Past Medical  Past Medical History:   Diagnosis Date     Acute alcoholic hepatitis      Cirrhosis (H)      H/O migraine      Past Surgical  Past Surgical History:   Procedure Laterality Date     ABDOMEN SURGERY      C Section      ENDOSCOPIC RETROGRADE CHOLANGIOPANCREATOGRAM N/A 10/1/2019    Procedure: ENDOSCOPIC RETROGRADE CHOLANGIOPANCREATOGRAPHY WITH BILIARY SPHINCTEROTOMY, STONE EXTRACTION AND GALLBLADDER STENT PLACEMENT;  Surgeon: Enmanuel Cordoba MD;  Location: [facility] OR     GYN SURGERY      1993 and 1995 C Section      Social History  Social History     Socioeconomic History     Marital status:      Spouse name: Not on file     Number of children: Not on file     Years of education: Not on file     Highest education level: Not on file   Occupational History     Not on file   Social Needs     Financial resource strain: Not on file     Food insecurity     Worry: Not on file     Inability: Not on file     Transportation needs     Medical: Not on file     Non-medical: Not on file   Tobacco Use     Smoking status: Current Every Day Smoker     Packs/day: 0.30     Years: 12.00     Pack years: 3.60     Types:  Cigarettes     Start date: 1/1/1980     Smokeless tobacco: Never Used   Substance and Sexual Activity     Alcohol use: Not Currently     Frequency: Never     Comment: Quit Jan 2, 2019. Drank 7 drinks a week.      Drug use: Never     Sexual activity: Not on file   Lifestyle     Physical activity     Days per week: Not on file     Minutes per session: Not on file     Stress: Not on file   Relationships     Social connections     Talks on phone: Not on file     Gets together: Not on file     Attends Druze service: Not on file     Active member of club or organization: Not on file     Attends meetings of clubs or organizations: Not on file     Relationship status: Not on file     Intimate partner violence     Fear of current or ex partner: Not on file     Emotionally abused: Not on file     Physically abused: Not on file     Forced sexual activity: Not on file   Other Topics Concern     Parent/sibling w/ CABG, MI or angioplasty before 65F 55M? Not Asked   Social History Narrative     Not on file     Family History  Family History   Problem Relation Age of Onset     Diabetes Type 2  Mother      Coronary Artery Disease Mother      Liver Cancer Maternal Grandfather      Diabetes Type 2  Father      Cerebrovascular Disease Father      Diabetes Type 2  Brother      Objective:  Reported vitals:  There were no vitals taken for this visit.   GEN: Healthy, alert and no distress  PSYCH: Alert and oriented times 3; coherent speech, normal   rate and volume, able to articulate logical thoughts, able   to abstract reason, no tangential thoughts, no hallucinations   or delusions  His affect is normal  RESP: No cough, no audible wheezing, able to talk in full sentences  Remainder of exam unable to be completed due to video visits     Outside Imaging summaries:    Assessment and plan:  Ms Fatima is a delightful 65yo woman with cirrhosis whom we managed cholecystitis in September of last year by placement of a transpapillary stent  and sphincterotomy by ERCP. Since the passage of the transpapillary stent, she has noted mild recurrence of symptoms similar to those she had previously. Given that they mirror reflux, we will increase her dose and frequency of her omeprazole, to 40mg twice daily. With improvement, we will need to dive deeper into lifestyl and eating habits. Without improvement, she will return to her low dose of 20mg once daily and we will obtain an interval RUQ US to further investigate recurrence of cholecystitis. With evidence of recurrence OR progressive symptoms of RUQ pain, we will schedule another  ERCP for transpapillary gallbladder stent placement. The patient was instructed to phone my RNCC with such symptoms.    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 The natural history of these were explained to the patient.    It was a pleasure to participate in the care of this patient; please contact us with any further questions.  A total of 45 minutes was spent in face to face evaluation with this patient, >50% of which was counseling regarding the above delineated issues.    Enmanuel Cordoba MD PhD FACG THUY PATEL  Director of Endoscopy  Associate Professor of Medicine, Surgery and Pediatrics  Interventional and Therapeutic Endoscopy    [facility]  Division of Gastroenterology and Hepatology  [facility] [address] [address]    New Consultations  [phone]  Procedure Scheduling [phone]  Clinical Nurse Coordinator [phone]  Clinical Fax   [phone]  Administrative   [phone]  Administrative Fax  [phone]    Again, thank you for allowing me to participate in the care of your patient.        Sincerely,        Enmanuel Cordoba MD

## 2020-05-28 NOTE — NURSING NOTE
"Chief Complaint   Patient presents with     Consult     Pancreatic Stent Displacement       Vitals:    05/28/20 0720   Weight: 63.5 kg (140 lb)   Height: 1.676 m (5' 6\")       Body mass index is 22.6 kg/m .      Kari Burns LPN                          "

## 2020-05-28 NOTE — PROGRESS NOTES
"Hazel Fatima is a 64 year old female who is being evaluated via a billable video visit.      The patient has been notified of following:     \"This video visit will be conducted via a call between you and your physician/provider. We have found that certain health care needs can be provided without the need for an in-person physical exam.  This service lets us provide the care you need with a video conversation.  If a prescription is necessary we can send it directly to your pharmacy.  If lab work is needed we can place an order for that and you can then stop by our lab to have the test done at a later time.    Video visits are billed at different rates depending on your insurance coverage.  Please reach out to your insurance provider with any questions.    If during the course of the call the physician/provider feels a video visit is not appropriate, you will not be charged for this service.\"    Patient has given verbal consent for Video visit? Yes    How would you like to obtain your AVS? MyChart and to home address.    Patient would like the video invitation sent by: Text to cell phone: [phone]    Will anyone else be joining your video visit? No        Video-Visit Details    Type of service:  Video Visit    Video Start Time: 0745  Video End Time: 0800    Originating Location (pt. Location): Home    Distant Location (provider location):  [email or web address]     Platform used for Video Visit: [employer]     Referring provider: Bernice Gonzales  Query: History of cholecystitis managed endoscopically    HPI  Ms Fatima is a 63yo woman with chronic liver disease (MELD near 15 during recent admission) thought from drinking who presented in September with evidence of cholecystitis. She was deemed a nonsurgical candidate so we performed an ERCP with sphincterotomy and placement of a transpapillary gallbladder stent. During this procedure it was clear the gallbladder was infected (drained pus) and she was " also found to have choledocholithiasis. She noted passage of the stent in her stool and she reached out to our group for follow up. She had an interval MRI which demonstrated a subcentimeter cystic lesion of the pancreatic body with concerning features and no evidence of cholecystitis. She also had a cirrhotic liver and reactive nodes.    She had significant RUQ/right flank at the time which resolved after the procedure. Since she past the stent she again has heartburn, with near nausea. It is not quite as severe as before the procedure. She does note only mild pain as compared to previous. She has not had fevers. She takes low dose omeprazole in the morning with minimal improvement.     Medications  Current Outpatient Medications   Medication     acetaminophen (TYLENOL) 325 MG tablet     acetone urine (KETOSTIX) test strip     BAQSIMI ONE PACK 3 MG/DOSE POWD     Continuous Blood Gluc Sensor (DEXCOM G6 SENSOR) MISC     furosemide (LASIX) 20 MG tablet     HUMALOG KWIKPEN 100 UNIT/ML soln     insulin glargine (LANTUS SOLOSTAR) 100 UNIT/ML pen     insulin pen needle (BD JUAN U/F) 32G X 4 MM miscellaneous     lactulose (CHRONULAC) 10 GM/15ML solution     omeprazole (PRILOSEC) 20 MG DR capsule     ondansetron (ZOFRAN-ODT) 4 MG ODT tab     OneTouch Delica Lancets 30G MISC     oxyCODONE (ROXICODONE) 5 MG tablet     potassium chloride ER (MICRO-K) 10 MEQ CR capsule     Sharps Container (BD SHARPS ) MISC     spironolactone (ALDACTONE) 50 MG tablet     VITAMIN B1 100 MG tablet     ONETOUCH VERIO IQ test strip     rifaximin (XIFAXAN) 550 MG TABS tablet     No current facility-administered medications for this visit.      Past Medical  Past Medical History:   Diagnosis Date     Acute alcoholic hepatitis      Cirrhosis (H)      H/O migraine      Past Surgical  Past Surgical History:   Procedure Laterality Date     ABDOMEN SURGERY      C Section      ENDOSCOPIC RETROGRADE CHOLANGIOPANCREATOGRAM N/A 10/1/2019     Procedure: ENDOSCOPIC RETROGRADE CHOLANGIOPANCREATOGRAPHY WITH BILIARY SPHINCTEROTOMY, STONE EXTRACTION AND GALLBLADDER STENT PLACEMENT;  Surgeon: Enmanuel Cordoba MD;  Location: [facility] OR     GYN SURGERY      1993 and 1995 C Section      Social History  Social History     Socioeconomic History     Marital status:      Spouse name: Not on file     Number of children: Not on file     Years of education: Not on file     Highest education level: Not on file   Occupational History     Not on file   Social Needs     Financial resource strain: Not on file     Food insecurity     Worry: Not on file     Inability: Not on file     Transportation needs     Medical: Not on file     Non-medical: Not on file   Tobacco Use     Smoking status: Current Every Day Smoker     Packs/day: 0.30     Years: 12.00     Pack years: 3.60     Types: Cigarettes     Start date: 1/1/1980     Smokeless tobacco: Never Used   Substance and Sexual Activity     Alcohol use: Not Currently     Frequency: Never     Comment: Quit Jan 2, 2019. Drank 7 drinks a week.      Drug use: Never     Sexual activity: Not on file   Lifestyle     Physical activity     Days per week: Not on file     Minutes per session: Not on file     Stress: Not on file   Relationships     Social connections     Talks on phone: Not on file     Gets together: Not on file     Attends Adventism service: Not on file     Active member of club or organization: Not on file     Attends meetings of clubs or organizations: Not on file     Relationship status: Not on file     Intimate partner violence     Fear of current or ex partner: Not on file     Emotionally abused: Not on file     Physically abused: Not on file     Forced sexual activity: Not on file   Other Topics Concern     Parent/sibling w/ CABG, MI or angioplasty before 65F 55M? Not Asked   Social History Narrative     Not on file     Family History  Family History   Problem Relation Age of Onset     Diabetes Type 2  Mother       Coronary Artery Disease Mother      Liver Cancer Maternal Grandfather      Diabetes Type 2  Father      Cerebrovascular Disease Father      Diabetes Type 2  Brother      Objective:  Reported vitals:  There were no vitals taken for this visit.   GEN: Healthy, alert and no distress  PSYCH: Alert and oriented times 3; coherent speech, normal   rate and volume, able to articulate logical thoughts, able   to abstract reason, no tangential thoughts, no hallucinations   or delusions  His affect is normal  RESP: No cough, no audible wheezing, able to talk in full sentences  Remainder of exam unable to be completed due to video visits     Outside Imaging summaries:    Assessment and plan:  Ms aFtima is a delightful 63yo woman with cirrhosis whom we managed cholecystitis in September of last year by placement of a transpapillary stent and sphincterotomy by ERCP. Since the passage of the transpapillary stent, she has noted mild recurrence of symptoms similar to those she had previously. Given that they mirror reflux, we will increase her dose and frequency of her omeprazole, to 40mg twice daily. With improvement, we will need to dive deeper into lifestyl and eating habits. Without improvement, she will return to her low dose of 20mg once daily and we will obtain an interval RUQ US to further investigate recurrence of cholecystitis. With evidence of recurrence OR progressive symptoms of RUQ pain, we will schedule another  ERCP for transpapillary gallbladder stent placement. The patient was instructed to phone my RNCC with such symptoms.    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 The natural history of these were explained to the patient.    It was a pleasure to participate in the care of this patient; please contact us with  any further questions.  A total of 45 minutes was spent in face to face evaluation with this patient, >50% of which was counseling regarding the above delineated issues.    Enmanuel Cordoba MD PhD JAMARCUSG THUY PATEL  Director of Endoscopy  Associate Professor of Medicine, Surgery and Pediatrics  Interventional and Therapeutic Endoscopy    [facility]  Division of Gastroenterology and Hepatology  [facility] [address] [place], [place] [address]    New Consultations  [phone]  Procedure Scheduling [phone]  Clinical Nurse Coordinator [phone]  Clinical Fax   [phone]  Administrative   [phone]  Administrative Fax  [phone]

## 2020-06-01 ENCOUNTER — PATIENT OUTREACH (OUTPATIENT)
Dept: GASTROENTEROLOGY | Facility: CLINIC | Age: 65
End: 2020-06-01

## 2020-06-01 NOTE — TELEPHONE ENCOUNTER
Patient called to inquire about changes to medication:    Given that they mirror reflux, we will increase her dose and frequency of her omeprazole, to 40mg twice daily. With improvement, we will need to dive deeper into lifestyl and eating habits. Without improvement, she will return to her low dose of 20mg once daily     Patient understands plan.    Terri Reddy RN Care Coordinator

## 2020-06-20 DIAGNOSIS — K21.9 GASTROESOPHAGEAL REFLUX DISEASE, ESOPHAGITIS PRESENCE NOT SPECIFIED: ICD-10-CM

## 2020-06-24 ENCOUNTER — PATIENT OUTREACH (OUTPATIENT)
Dept: GASTROENTEROLOGY | Facility: CLINIC | Age: 65
End: 2020-06-24

## 2020-06-24 DIAGNOSIS — R10.13 DYSPEPSIA: Primary | ICD-10-CM

## 2020-06-24 DIAGNOSIS — R10.84 ABDOMINAL PAIN, GENERALIZED: ICD-10-CM

## 2020-06-24 RX ORDER — OMEPRAZOLE 40 MG/1
40 CAPSULE, DELAYED RELEASE ORAL
Qty: 60 CAPSULE | Refills: 0 | OUTPATIENT
Start: 2020-06-24

## 2020-06-24 NOTE — TELEPHONE ENCOUNTER
omeprazole (PRILOSEC) 40 MG  Last Written Prescription Date:  5/28/20  Last Fill Quantity: 60,   # refills: 0  Last Office Visit : 5/28/20  Future Office visit:  NONE  Routing refill request to provider for review/approval because:  SEE  TEL. 5/28 & 6/1 FOR PT FOLLOW UP RE MED / DOSE

## 2020-06-24 NOTE — TELEPHONE ENCOUNTER
Patient called in, high dose of PPI not helping.     Without improvement, she will return to her low dose of 20mg once daily and we will obtain an interval RUQ US to further investigate recurrence of cholecystitis    Orders placed for RUQ US. Prefers to be done at [place], [phone], number given to schedule

## 2020-06-25 ENCOUNTER — ANCILLARY PROCEDURE (OUTPATIENT)
Dept: ULTRASOUND IMAGING | Facility: CLINIC | Age: 65
End: 2020-06-25
Attending: INTERNAL MEDICINE
Payer: COMMERCIAL

## 2020-06-25 DIAGNOSIS — R10.13 DYSPEPSIA: ICD-10-CM

## 2020-06-25 DIAGNOSIS — R10.84 ABDOMINAL PAIN, GENERALIZED: ICD-10-CM

## 2020-06-25 LAB — RADIOLOGIST FLAGS: ABNORMAL

## 2020-06-25 PROCEDURE — 76705 ECHO EXAM OF ABDOMEN: CPT

## 2020-06-29 ENCOUNTER — PATIENT OUTREACH (OUTPATIENT)
Dept: GASTROENTEROLOGY | Facility: CLINIC | Age: 65
End: 2020-06-29

## 2020-06-29 DIAGNOSIS — R93.3 ABNORMAL FINDING ON GI TRACT IMAGING: Primary | ICD-10-CM

## 2020-06-29 NOTE — TELEPHONE ENCOUNTER
Per Dr. Cordoba  She needs a CT with and without iv contrast to exclude gallbladder cancer (I dont think it is)   And if not we will redo an ERCP for GB stent placement     Ct orders placed. Left message for patient.    Terri Reddy RN Care Coordinator

## 2020-07-02 ENCOUNTER — ANCILLARY PROCEDURE (OUTPATIENT)
Dept: CT IMAGING | Facility: CLINIC | Age: 65
End: 2020-07-02
Attending: INTERNAL MEDICINE
Payer: COMMERCIAL

## 2020-07-02 ENCOUNTER — PATIENT OUTREACH (OUTPATIENT)
Dept: GASTROENTEROLOGY | Facility: CLINIC | Age: 65
End: 2020-07-02

## 2020-07-02 DIAGNOSIS — R93.3 ABNORMAL FINDING ON GI TRACT IMAGING: Primary | ICD-10-CM

## 2020-07-02 DIAGNOSIS — R93.3 ABNORMAL FINDING ON GI TRACT IMAGING: ICD-10-CM

## 2020-07-02 LAB
CREAT BLD-MCNC: 0.8 MG/DL (ref 0.52–1.04)
GFR SERPL CREATININE-BSD FRML MDRD: 73 ML/MIN/{1.73_M2}
RADIOLOGIST FLAGS: ABNORMAL

## 2020-07-02 PROCEDURE — 74160 CT ABDOMEN W/CONTRAST: CPT | Performed by: RADIOLOGY

## 2020-07-02 RX ORDER — IOPAMIDOL 755 MG/ML
107 INJECTION, SOLUTION INTRAVASCULAR ONCE
Status: COMPLETED | OUTPATIENT
Start: 2020-07-02 | End: 2020-07-02

## 2020-07-02 RX ADMIN — IOPAMIDOL 107 ML: 755 INJECTION, SOLUTION INTRAVASCULAR at 10:13

## 2020-07-02 NOTE — TELEPHONE ENCOUNTER
Returned patient call regarding CT from today. Per Dr. Cordoba   MRI/MRCP with IV contrast     Orders placed. Patient understands follow up.    Terri Reddy, RN Care Coordinator

## 2020-07-24 ENCOUNTER — ANCILLARY PROCEDURE (OUTPATIENT)
Dept: MRI IMAGING | Facility: CLINIC | Age: 65
End: 2020-07-24
Attending: INTERNAL MEDICINE
Payer: COMMERCIAL

## 2020-07-24 DIAGNOSIS — R93.3 ABNORMAL FINDING ON GI TRACT IMAGING: ICD-10-CM

## 2020-07-24 PROCEDURE — 74183 MRI ABD W/O CNTR FLWD CNTR: CPT | Performed by: RADIOLOGY

## 2020-07-24 PROCEDURE — A9585 GADOBUTROL INJECTION: HCPCS | Mod: JW | Performed by: RADIOLOGY

## 2020-07-24 RX ORDER — GADOBUTROL 604.72 MG/ML
7.5 INJECTION INTRAVENOUS ONCE
Status: COMPLETED | OUTPATIENT
Start: 2020-07-24 | End: 2020-07-24

## 2020-07-24 RX ADMIN — GADOBUTROL 6.5 ML: 604.72 INJECTION INTRAVENOUS at 10:53

## 2020-08-04 ENCOUNTER — PREP FOR PROCEDURE (OUTPATIENT)
Dept: GASTROENTEROLOGY | Facility: CLINIC | Age: 65
End: 2020-08-04

## 2020-08-04 ENCOUNTER — PATIENT OUTREACH (OUTPATIENT)
Dept: GASTROENTEROLOGY | Facility: CLINIC | Age: 65
End: 2020-08-04

## 2020-08-04 DIAGNOSIS — R10.11 RUQ ABDOMINAL PAIN: Primary | ICD-10-CM

## 2020-08-04 NOTE — TELEPHONE ENCOUNTER
MRCP follow up, per Dr. Cordoba    Please pass along the good news that it does not appear to be a cancer of the gallbladder and that if she is having RUQ pain we should move forward with attempt of stent placement to the gallbladder by ERCP.     Left message to discuss. Patient called back    Please assist in scheduling:     Procedure/Imaging/Clinic: ERCP  Physician: Dr. Cordoba    Timin2020  Procedure length:75 min  Anesthesia:gen  Dx: RUQ pain  Tier:2  Location: [facility]       Comments:       Called to discuss with patient. Explained they can expect a call for date and time for procedure, will need a , someone to stay with them for 24 hours and should stay in town for 24 hours (within 45 min of Hospital) post procedure    Patient needs to get pre-op physical completed and will fax a copy to us along with bringing a hard copy with them. Fax number given. [phone] *If you do not get a preop physical, your procedure could be cancelled, patient voiced understanding*    Preop Plan:PCP will do     Med Review    Blood thinner -  no  ASA - no  Diabetic - yes    COVID test discussed:understands need for test, will do at [facility]    Patient Education r/t procedure:questions answers      A pre-op nurse will call 1-2 days prior to the procedure. Is advised to be NPO/no solid food 8 hours before the procedure. Ok to drink clear liquids (Water, Apple Juice or Gatorade) up to 2 hours prior to procedure.     Other specific details/comments:no     Verbalized understanding of all instructions. All questions answered.              Terri Reddy RN Care Coordinator

## 2020-08-05 PROBLEM — R10.11 RUQ ABDOMINAL PAIN: Status: ACTIVE | Noted: 2020-08-05

## 2020-08-11 DIAGNOSIS — Z11.59 ENCOUNTER FOR SCREENING FOR OTHER VIRAL DISEASES: Primary | ICD-10-CM

## 2020-08-17 ENCOUNTER — PATIENT OUTREACH (OUTPATIENT)
Dept: GASTROENTEROLOGY | Facility: CLINIC | Age: 65
End: 2020-08-17

## 2020-08-17 NOTE — TELEPHONE ENCOUNTER
Returned patients call about timing of procedure and COVID test- scheduling number given. Reviewed estimated arrival time.    Terri Reddy RN Care Coordinator

## 2020-08-21 DIAGNOSIS — Z11.59 ENCOUNTER FOR SCREENING FOR OTHER VIRAL DISEASES: ICD-10-CM

## 2020-08-21 PROCEDURE — U0003 INFECTIOUS AGENT DETECTION BY NUCLEIC ACID (DNA OR RNA); SEVERE ACUTE RESPIRATORY SYNDROME CORONAVIRUS 2 (SARS-COV-2) (CORONAVIRUS DISEASE [COVID-19]), AMPLIFIED PROBE TECHNIQUE, MAKING USE OF HIGH THROUGHPUT TECHNOLOGIES AS DESCRIBED BY CMS-2020-01-R: HCPCS | Performed by: INTERNAL MEDICINE

## 2020-08-22 LAB
SARS-COV-2 RNA SPEC QL NAA+PROBE: NOT DETECTED
SPECIMEN SOURCE: NORMAL

## 2020-08-24 ENCOUNTER — APPOINTMENT (OUTPATIENT)
Dept: GENERAL RADIOLOGY | Facility: CLINIC | Age: 65
End: 2020-08-24
Attending: INTERNAL MEDICINE
Payer: COMMERCIAL

## 2020-08-24 ENCOUNTER — ANESTHESIA (OUTPATIENT)
Dept: SURGERY | Facility: CLINIC | Age: 65
End: 2020-08-24
Payer: COMMERCIAL

## 2020-08-24 ENCOUNTER — HOSPITAL ENCOUNTER (OUTPATIENT)
Facility: CLINIC | Age: 65
Discharge: HOME | End: 2020-08-24
Attending: INTERNAL MEDICINE | Admitting: INTERNAL MEDICINE
Payer: COMMERCIAL

## 2020-08-24 ENCOUNTER — ANESTHESIA EVENT (OUTPATIENT)
Dept: SURGERY | Facility: CLINIC | Age: 65
End: 2020-08-24
Payer: COMMERCIAL

## 2020-08-24 VITALS
DIASTOLIC BLOOD PRESSURE: 90 MMHG | SYSTOLIC BLOOD PRESSURE: 133 MMHG | HEART RATE: 67 BPM | HEIGHT: 66 IN | OXYGEN SATURATION: 99 % | RESPIRATION RATE: 16 BRPM | TEMPERATURE: 96 F | BODY MASS INDEX: 23.4 KG/M2 | WEIGHT: 145.6 LBS

## 2020-08-24 DIAGNOSIS — R10.11 RUQ ABDOMINAL PAIN: ICD-10-CM

## 2020-08-24 LAB
ALBUMIN SERPL-MCNC: 3 G/DL (ref 3.4–5)
ALP SERPL-CCNC: 152 U/L (ref 40–150)
ALT SERPL W P-5'-P-CCNC: 19 U/L (ref 0–50)
AMYLASE SERPL-CCNC: 86 U/L (ref 30–110)
ANION GAP SERPL CALCULATED.3IONS-SCNC: 5 MMOL/L (ref 3–14)
AST SERPL W P-5'-P-CCNC: 31 U/L (ref 0–45)
BASOPHILS # BLD AUTO: 0 10E9/L (ref 0–0.2)
BASOPHILS NFR BLD AUTO: 0.4 %
BILIRUB SERPL-MCNC: 1.8 MG/DL (ref 0.2–1.3)
BUN SERPL-MCNC: 10 MG/DL (ref 7–30)
CALCIUM SERPL-MCNC: 10 MG/DL (ref 8.5–10.1)
CHLORIDE SERPL-SCNC: 105 MMOL/L (ref 94–109)
CO2 SERPL-SCNC: 26 MMOL/L (ref 20–32)
CREAT SERPL-MCNC: 0.85 MG/DL (ref 0.52–1.04)
DIFFERENTIAL METHOD BLD: NORMAL
EOSINOPHIL # BLD AUTO: 0.2 10E9/L (ref 0–0.7)
EOSINOPHIL NFR BLD AUTO: 2.7 %
ERCP: NORMAL
ERYTHROCYTE [DISTWIDTH] IN BLOOD BY AUTOMATED COUNT: 14.9 % (ref 10–15)
GFR SERPL CREATININE-BSD FRML MDRD: 73 ML/MIN/{1.73_M2}
GLUCOSE BLDC GLUCOMTR-MCNC: 116 MG/DL (ref 70–99)
GLUCOSE SERPL-MCNC: 168 MG/DL (ref 70–99)
HCT VFR BLD AUTO: 42.3 % (ref 35–47)
HGB BLD-MCNC: 14.3 G/DL (ref 11.7–15.7)
IMM GRANULOCYTES # BLD: 0 10E9/L (ref 0–0.4)
IMM GRANULOCYTES NFR BLD: 0.3 %
INR PPP: 1.17 (ref 0.86–1.14)
LIPASE SERPL-CCNC: 226 U/L (ref 73–393)
LYMPHOCYTES # BLD AUTO: 2.3 10E9/L (ref 0.8–5.3)
LYMPHOCYTES NFR BLD AUTO: 31.7 %
MCH RBC QN AUTO: 31 PG (ref 26.5–33)
MCHC RBC AUTO-ENTMCNC: 33.8 G/DL (ref 31.5–36.5)
MCV RBC AUTO: 92 FL (ref 78–100)
MONOCYTES # BLD AUTO: 1.1 10E9/L (ref 0–1.3)
MONOCYTES NFR BLD AUTO: 15.4 %
NEUTROPHILS # BLD AUTO: 3.6 10E9/L (ref 1.6–8.3)
NEUTROPHILS NFR BLD AUTO: 49.5 %
NRBC # BLD AUTO: 0 10*3/UL
NRBC BLD AUTO-RTO: 0 /100
PLATELET # BLD AUTO: 211 10E9/L (ref 150–450)
POTASSIUM SERPL-SCNC: 3.5 MMOL/L (ref 3.4–5.3)
PROT SERPL-MCNC: 6.8 G/DL (ref 6.8–8.8)
RBC # BLD AUTO: 4.62 10E12/L (ref 3.8–5.2)
SODIUM SERPL-SCNC: 136 MMOL/L (ref 133–144)
WBC # BLD AUTO: 7.2 10E9/L (ref 4–11)

## 2020-08-24 PROCEDURE — 27210286 ZZH BALLOON ADDITIONAL: Performed by: INTERNAL MEDICINE

## 2020-08-24 PROCEDURE — 74330 X-RAY BILE/PANC ENDOSCOPY: CPT

## 2020-08-24 PROCEDURE — C1769 GUIDE WIRE: HCPCS | Performed by: INTERNAL MEDICINE

## 2020-08-24 PROCEDURE — 71000027 ZZH RECOVERY PHASE 2 EACH 15 MINS: Performed by: INTERNAL MEDICINE

## 2020-08-24 PROCEDURE — 36000056 ZZH SURGERY LEVEL 3 1ST 30 MIN: Performed by: INTERNAL MEDICINE

## 2020-08-24 PROCEDURE — 71000012 ZZH RECOVERY PHASE 1 LEVEL 1 FIRST HR: Performed by: INTERNAL MEDICINE

## 2020-08-24 PROCEDURE — 85025 COMPLETE CBC W/AUTO DIFF WBC: CPT | Performed by: INTERNAL MEDICINE

## 2020-08-24 PROCEDURE — 25000566 ZZH SEVOFLURANE, EA 15 MIN: Performed by: INTERNAL MEDICINE

## 2020-08-24 PROCEDURE — 25000128 H RX IP 250 OP 636

## 2020-08-24 PROCEDURE — 25800030 ZZH RX IP 258 OP 636

## 2020-08-24 PROCEDURE — 37000008 ZZH ANESTHESIA TECHNICAL FEE, 1ST 30 MIN: Performed by: INTERNAL MEDICINE

## 2020-08-24 PROCEDURE — C2617 STENT, NON-COR, TEM W/O DEL: HCPCS | Performed by: INTERNAL MEDICINE

## 2020-08-24 PROCEDURE — 36000058 ZZH SURGERY LEVEL 3 EA 15 ADDTL MIN: Performed by: INTERNAL MEDICINE

## 2020-08-24 PROCEDURE — 36415 COLL VENOUS BLD VENIPUNCTURE: CPT | Performed by: INTERNAL MEDICINE

## 2020-08-24 PROCEDURE — 82150 ASSAY OF AMYLASE: CPT | Performed by: INTERNAL MEDICINE

## 2020-08-24 PROCEDURE — 25000128 H RX IP 250 OP 636: Performed by: ANESTHESIOLOGY

## 2020-08-24 PROCEDURE — 82962 GLUCOSE BLOOD TEST: CPT

## 2020-08-24 PROCEDURE — 85610 PROTHROMBIN TIME: CPT | Performed by: INTERNAL MEDICINE

## 2020-08-24 PROCEDURE — 25000125 ZZHC RX 250

## 2020-08-24 PROCEDURE — 40000170 ZZH STATISTIC PRE-PROCEDURE ASSESSMENT II: Performed by: INTERNAL MEDICINE

## 2020-08-24 PROCEDURE — C1887 CATHETER, GUIDING: HCPCS | Performed by: INTERNAL MEDICINE

## 2020-08-24 PROCEDURE — 76499 UNLISTED DX RADIOGRAPHIC PX: CPT

## 2020-08-24 PROCEDURE — 83690 ASSAY OF LIPASE: CPT | Performed by: INTERNAL MEDICINE

## 2020-08-24 PROCEDURE — 80053 COMPREHEN METABOLIC PANEL: CPT | Performed by: INTERNAL MEDICINE

## 2020-08-24 PROCEDURE — 37000009 ZZH ANESTHESIA TECHNICAL FEE, EACH ADDTL 15 MIN: Performed by: INTERNAL MEDICINE

## 2020-08-24 RX ORDER — NALOXONE HYDROCHLORIDE 0.4 MG/ML
.1-.4 INJECTION, SOLUTION INTRAMUSCULAR; INTRAVENOUS; SUBCUTANEOUS
Status: DISCONTINUED | OUTPATIENT
Start: 2020-08-24 | End: 2020-08-24 | Stop reason: HOSPADM

## 2020-08-24 RX ORDER — FENTANYL CITRATE 50 UG/ML
INJECTION, SOLUTION INTRAMUSCULAR; INTRAVENOUS PRN
Status: DISCONTINUED | OUTPATIENT
Start: 2020-08-24 | End: 2020-08-24

## 2020-08-24 RX ORDER — ONDANSETRON 2 MG/ML
4 INJECTION INTRAMUSCULAR; INTRAVENOUS EVERY 30 MIN PRN
Status: DISCONTINUED | OUTPATIENT
Start: 2020-08-24 | End: 2020-08-24 | Stop reason: HOSPADM

## 2020-08-24 RX ORDER — FENTANYL CITRATE 50 UG/ML
25-50 INJECTION, SOLUTION INTRAMUSCULAR; INTRAVENOUS
Status: DISCONTINUED | OUTPATIENT
Start: 2020-08-24 | End: 2020-08-24 | Stop reason: HOSPADM

## 2020-08-24 RX ORDER — DEXAMETHASONE SODIUM PHOSPHATE 4 MG/ML
INJECTION, SOLUTION INTRA-ARTICULAR; INTRALESIONAL; INTRAMUSCULAR; INTRAVENOUS; SOFT TISSUE PRN
Status: DISCONTINUED | OUTPATIENT
Start: 2020-08-24 | End: 2020-08-24

## 2020-08-24 RX ORDER — MEPERIDINE HYDROCHLORIDE 25 MG/ML
12.5 INJECTION INTRAMUSCULAR; INTRAVENOUS; SUBCUTANEOUS
Status: DISCONTINUED | OUTPATIENT
Start: 2020-08-24 | End: 2020-08-24 | Stop reason: HOSPADM

## 2020-08-24 RX ORDER — SODIUM CHLORIDE, SODIUM LACTATE, POTASSIUM CHLORIDE, CALCIUM CHLORIDE 600; 310; 30; 20 MG/100ML; MG/100ML; MG/100ML; MG/100ML
INJECTION, SOLUTION INTRAVENOUS CONTINUOUS
Status: DISCONTINUED | OUTPATIENT
Start: 2020-08-24 | End: 2020-08-24 | Stop reason: HOSPADM

## 2020-08-24 RX ORDER — ONDANSETRON 4 MG/1
4 TABLET, ORALLY DISINTEGRATING ORAL EVERY 30 MIN PRN
Status: DISCONTINUED | OUTPATIENT
Start: 2020-08-24 | End: 2020-08-24 | Stop reason: HOSPADM

## 2020-08-24 RX ORDER — LIDOCAINE HYDROCHLORIDE 20 MG/ML
INJECTION, SOLUTION INFILTRATION; PERINEURAL PRN
Status: DISCONTINUED | OUTPATIENT
Start: 2020-08-24 | End: 2020-08-24

## 2020-08-24 RX ORDER — PROPOFOL 10 MG/ML
INJECTION, EMULSION INTRAVENOUS PRN
Status: DISCONTINUED | OUTPATIENT
Start: 2020-08-24 | End: 2020-08-24

## 2020-08-24 RX ORDER — LIDOCAINE 40 MG/G
CREAM TOPICAL
Status: DISCONTINUED | OUTPATIENT
Start: 2020-08-24 | End: 2020-08-24 | Stop reason: HOSPADM

## 2020-08-24 RX ORDER — SODIUM CHLORIDE, SODIUM LACTATE, POTASSIUM CHLORIDE, CALCIUM CHLORIDE 600; 310; 30; 20 MG/100ML; MG/100ML; MG/100ML; MG/100ML
INJECTION, SOLUTION INTRAVENOUS CONTINUOUS PRN
Status: DISCONTINUED | OUTPATIENT
Start: 2020-08-24 | End: 2020-08-24

## 2020-08-24 RX ORDER — INDOMETHACIN 50 MG/1
100 SUPPOSITORY RECTAL
Status: DISCONTINUED | OUTPATIENT
Start: 2020-08-24 | End: 2020-08-24 | Stop reason: HOSPADM

## 2020-08-24 RX ORDER — ONDANSETRON 2 MG/ML
INJECTION INTRAMUSCULAR; INTRAVENOUS PRN
Status: DISCONTINUED | OUTPATIENT
Start: 2020-08-24 | End: 2020-08-24

## 2020-08-24 RX ORDER — HYDROMORPHONE HYDROCHLORIDE 1 MG/ML
.3-.5 INJECTION, SOLUTION INTRAMUSCULAR; INTRAVENOUS; SUBCUTANEOUS EVERY 10 MIN PRN
Status: DISCONTINUED | OUTPATIENT
Start: 2020-08-24 | End: 2020-08-24 | Stop reason: HOSPADM

## 2020-08-24 RX ADMIN — DEXAMETHASONE SODIUM PHOSPHATE 4 MG: 4 INJECTION, SOLUTION INTRA-ARTICULAR; INTRALESIONAL; INTRAMUSCULAR; INTRAVENOUS; SOFT TISSUE at 11:24

## 2020-08-24 RX ADMIN — FENTANYL CITRATE 50 MCG: 50 INJECTION, SOLUTION INTRAMUSCULAR; INTRAVENOUS at 11:27

## 2020-08-24 RX ADMIN — ONDANSETRON 4 MG: 2 INJECTION INTRAMUSCULAR; INTRAVENOUS at 11:56

## 2020-08-24 RX ADMIN — SODIUM CHLORIDE, POTASSIUM CHLORIDE, SODIUM LACTATE AND CALCIUM CHLORIDE: 600; 310; 30; 20 INJECTION, SOLUTION INTRAVENOUS at 10:32

## 2020-08-24 RX ADMIN — SODIUM CHLORIDE, POTASSIUM CHLORIDE, SODIUM LACTATE AND CALCIUM CHLORIDE: 600; 310; 30; 20 INJECTION, SOLUTION INTRAVENOUS at 11:56

## 2020-08-24 RX ADMIN — MIDAZOLAM 2 MG: 1 INJECTION INTRAMUSCULAR; INTRAVENOUS at 10:31

## 2020-08-24 RX ADMIN — FENTANYL CITRATE 100 MCG: 50 INJECTION, SOLUTION INTRAMUSCULAR; INTRAVENOUS at 10:37

## 2020-08-24 RX ADMIN — LIDOCAINE HYDROCHLORIDE 100 MG: 20 INJECTION, SOLUTION INFILTRATION; PERINEURAL at 10:37

## 2020-08-24 RX ADMIN — PROPOFOL 180 MG: 10 INJECTION, EMULSION INTRAVENOUS at 10:37

## 2020-08-24 RX ADMIN — PHENYLEPHRINE HYDROCHLORIDE 100 MCG: 10 INJECTION INTRAVENOUS at 10:43

## 2020-08-24 RX ADMIN — MEPERIDINE HYDROCHLORIDE 12.5 MG: 25 INJECTION INTRAMUSCULAR; INTRAVENOUS; SUBCUTANEOUS at 12:41

## 2020-08-24 RX ADMIN — ROCURONIUM BROMIDE 40 MG: 10 INJECTION INTRAVENOUS at 10:37

## 2020-08-24 RX ADMIN — GLUCAGON HYDROCHLORIDE 0.4 MG: KIT at 11:32

## 2020-08-24 RX ADMIN — SUGAMMADEX 200 MG: 100 INJECTION, SOLUTION INTRAVENOUS at 11:54

## 2020-08-24 ASSESSMENT — LIFESTYLE VARIABLES: TOBACCO_USE: 1

## 2020-08-24 ASSESSMENT — MIFFLIN-ST. JEOR: SCORE: 1237.19

## 2020-08-24 NOTE — ANESTHESIA PREPROCEDURE EVALUATION
Anesthesia Pre-Procedure Evaluation    Patient: Hazel Fatima   MRN: [id number] : 1955          Preoperative Diagnosis: RUQ abdominal pain [R10.11]    Procedure(s):  ENDOSCOPIC RETROGRADE CHOLANGIOPANCREATOGRAPHY    Past Medical History:   Diagnosis Date     Acute alcoholic hepatitis      Cirrhosis (H)      H/O migraine      Past Surgical History:   Procedure Laterality Date     ABDOMEN SURGERY      C Section      ENDOSCOPIC RETROGRADE CHOLANGIOPANCREATOGRAM N/A 10/1/2019    Procedure: ENDOSCOPIC RETROGRADE CHOLANGIOPANCREATOGRAPHY WITH BILIARY SPHINCTEROTOMY, STONE EXTRACTION AND GALLBLADDER STENT PLACEMENT;  Surgeon: Enmanuel Cordoba MD;  Location: [facility] OR     GYN SURGERY       and  C Section        Anesthesia Evaluation     .             ROS/MED HX    ENT/Pulmonary:     (+)tobacco use, Current use 8 cigs/day packs/day  , . .    Neurologic:     (+)migraines,     Cardiovascular:  - neg cardiovascular ROS       METS/Exercise Tolerance:     Hematologic:     (+) Anemia, Other Hematologic Disorder-thrombocytopenia      Musculoskeletal:  - neg musculoskeletal ROS       GI/Hepatic:     (+) GERD liver disease, Other GI/Hepatic ETOH cirrhosis      Renal/Genitourinary:         Endo:     (+) type II DM Using insulin .      Psychiatric:  - neg psychiatric ROS       Infectious Disease:         Malignancy:         Other:                          Physical Exam  Normal systems: cardiovascular, pulmonary and dental    Airway   Mallampati: II  TM distance: >3 FB  Neck ROM: full    Dental     Cardiovascular       Pulmonary             Lab Results   Component Value Date    WBC 8.4 10/03/2019    HGB 10.7 (L) 10/03/2019    HCT 32.9 (L) 10/03/2019     10/03/2019     10/03/2019    POTASSIUM 3.3 (L) 10/03/2019    CHLORIDE 108 10/03/2019    CO2 23 10/03/2019    BUN 4 (L) 10/03/2019    CR 0.72 10/03/2019     (H) 10/03/2019    TAI 8.3 (L) 10/03/2019    MAG 1.8 10/03/2019    ALBUMIN 1.8 (L)  "10/03/2019    PROTTOTAL 5.1 (L) 10/03/2019    ALT 14 10/03/2019    AST 28 10/03/2019    ALKPHOS 101 10/03/2019    BILITOTAL 1.3 10/03/2019    LIPASE 150 09/29/2019    WES 11 10/03/2019    PTT 31 09/29/2019    INR 1.59 (H) 10/02/2019       Preop Vitals  BP Readings from Last 3 Encounters:   10/03/19 106/52   06/25/19 121/64    Pulse Readings from Last 3 Encounters:   10/03/19 68   06/25/19 58      Resp Readings from Last 3 Encounters:   10/03/19 18   06/25/19 18    SpO2 Readings from Last 3 Encounters:   10/03/19 98%      Temp Readings from Last 1 Encounters:   10/03/19 36.2  C (97.1  F) (Oral)    Ht Readings from Last 1 Encounters:   05/28/20 1.676 m (5' 6\")      Wt Readings from Last 1 Encounters:   05/28/20 63.5 kg (140 lb)    Estimated body mass index is 22.6 kg/m  as calculated from the following:    Height as of 5/28/20: 1.676 m (5' 6\").    Weight as of 5/28/20: 63.5 kg (140 lb).       Anesthesia Plan      History & Physical Review  History and physical reviewed and following examination; no interval change.    ASA Status:  3 .    NPO Status:  > 8 hours    Plan for General with Intravenous induction. Maintenance will be Balanced.    PONV prophylaxis:  Ondansetron (or other 5HT-3) and Dexamethasone or Solumedrol         Postoperative Care  Postoperative pain management:  IV analgesics.      Consents  Anesthetic plan, risks, benefits and alternatives discussed with:  Patient..                 Dharmesh Williamson, DO, DO  "

## 2020-08-24 NOTE — ANESTHESIA POSTPROCEDURE EVALUATION
Patient: Hazel Fatima    Procedure(s):  ENDOSCOPIC RETROGRADE CHOLANGIOPANCREATOGRAPHY    Diagnosis:RUQ abdominal pain [R10.11]  Diagnosis Additional Information: No value filed.    Anesthesia Type:  General    Note:  Anesthesia Post Evaluation    Patient location during evaluation: bedside  Patient participation: Able to fully participate in evaluation  Level of consciousness: awake  Pain management: adequate  Airway patency: patent  Cardiovascular status: acceptable  Respiratory status: acceptable  Hydration status: acceptable  PONV: none     Anesthetic complications: None    Comments: No anesthetic complications noted.         Last vitals:  Vitals:    08/24/20 1245 08/24/20 1300 08/24/20 1348   BP: 138/80 133/65 (!) 133/90   Pulse: 79 79 67   Resp: 12 13 16   Temp: 35.4  C (95.7  F) 35.6  C (96  F)    SpO2: 98% 96% 99%         Electronically Signed By: Dharmesh Williamson DO, DO  August 24, 2020  2:23 PM

## 2020-08-24 NOTE — DISCHARGE INSTRUCTIONS
*** See Endoscopy Report for additional information and instructions ***      Same Day Surgery Discharge Instructions for  Sedation and General Anesthesia       It's not unusual to feel dizzy, light-headed or faint for up to 24 hours after surgery or while taking pain medication.  If you have these symptoms: sit for a few minutes before standing and have someone assist you when you get up to walk or use the bathroom.      You should rest and relax for the next 24 hours. We recommend you make arrangements to have an adult stay with you for at least 24 hours after your discharge.  Avoid hazardous and strenuous activity.      DO NOT DRIVE any vehicle or operate mechanical equipment for 24 hours following the end of your surgery.  Even though you may feel normal, your reactions may be affected by the medication you have received.      Do not drink alcoholic beverages for 24 hours following surgery.       Slowly progress to your regular diet as you feel able. It's not unusual to feel nauseated and/or vomit after receiving anesthesia.  If you develop these symptoms, drink clear liquids (apple juice, ginger ale, broth, 7-up, etc. ) until you feel better.  If your nausea and vomiting persists for 24 hours, please notify your surgeon.        All narcotic pain medications, along with inactivity and anesthesia, can cause constipation. Drinking plenty of liquids and increasing fiber intake will help.      For any questions of a medical nature, call your surgeon.      Do not make important decisions for 24 hours.      If you had general anesthesia, you may have a sore throat for a couple of days related to the breathing tube used during surgery.  You may use Cepacol lozenges to help with this discomfort.  If it worsens or if you develop a fever, contact your surgeon.       If you feel your pain is not well managed with the pain medications prescribed by your surgeon, please contact your surgeon's office to let them know so  they can address your concerns.           CoVid 19 Information    We want to give you information regarding Covid. Please consult your primary care provider with any questions you might have.     Patient who have symptoms (cough, fever, or shortness of breath), need to isolate for 7 days from when symptoms started OR 72 hours after fever resolves (without fever reducing medications) AND improvement of respiratory symptoms (whichever is longer).      Isolate yourself at home (in own room/own bathroom if possible)    Do Not allow any visitors    Do Not go to work or school    Do Not go to [employer],  centers, shopping, or other public places.    Do Not shake hands.    Avoid close and intimate contact with others (hugging, kissing).    Follow CDC recommendations for household cleaning of frequently touched services.     After the initial 7 days, continue to isolate yourself from household members as much as possible. To continue decrease the risk of community spread and exposure, you and any members of your household should limit activities in public for 14 days after starting home isolation.     You can reference the following CDC link for helpful home isolation/care tips:  https://www.cdc.gov/coronavirus/2019-ncov/downloads/10Things.pdf    Protect Others:    Cover Your Mouth and Nose with a mask, disposable tissue or wash cloth to avoid spreading germs to others.    Wash your hands and face frequently with soap and water    Call Your Primary Doctor If: Breathing difficulty develops or you become worse.    For more information about COVID19 and options for caring for yourself at home, please visit the CDC website at https://www.cdc.gov/coronavirus/2019-ncov/about/steps-when-sick.html  For more options for care at [facility], please visit our website at [email or web address]          **If you have questions or concerns about your procedure,   call Dr. Cordoba at  [phone]**

## 2020-08-24 NOTE — OR NURSING
Discharge instructions provided over the phone to spouse Dajuan. Instructions placed in belongings bag and sent home with pt.

## 2020-08-24 NOTE — OP NOTE
ERCP  08/24/2020 10:21 AM  [facility]    [facility] End[facility] Department   _______________________________________________________________________________   Patient Name: Hazel Fatima         Procedure Date: 8/24/2020 10:21 AM   MRN: [id number]                       Account Number: [id number]   YOB: 1955              Admit Type: Outpatient   Age: 64                               Room: OR   Note Status: Finalized                Attending MD: Enmanuel Cordoba MD   Total Sedation Time:                  Instrument Name: 408 [id number]-Q180V ERCP   _______________________________________________________________________________       Procedure:                ERCP   Indications:              Cholecystitis, nonsurgical candidate at this time   Providers:                Enmaneul Cordoba MD, Christina Gonzalez, CHITRA, rob Cunningham, Technician, Shaquille Walden   Patient Profile:          Ms Fatima is a 63yo woman with liver disease who                             had an episode of cholecystitis in the past managed                             by ERC with transpapillary gallbladder stent                             resulting in resolution of pain and infection. The                             stent spontaneously migrated and with recurrent RUQ                             pain she now returns for repeat ERCP for stent                             replacement as she remains a poor surgical                             candidate. Of note, she did note overall                             improvement with high dose twice daily PPI, though                             her RUQ pain persists.   Referring MD:             Bernice Gonzales MD, Sharmila Chaidez MD   Medicines:                General Anesthesia, Contraindication to indomethacin   Complications:            No immediate complications.   _______________________________________________________________________________    Procedure:                Pre-Anesthesia Assessment:                             - Prior to the procedure, a History and Physical                             was performed, and patient medications and                             allergies were reviewed. The patient is competent.                             The risks and benefits of the procedure and the                             sedation options and risks were discussed with the                             patient. All questions were answered and informed                             consent was obtained. Patient identification and                             proposed procedure were verified by the nurse in                             the pre-procedure area. Mental Status Examination:                             alert and oriented. Airway Examination: Mallampati                             Class II (the uvula but not tonsillar pillars                             visualized). Respiratory Examination: clear to                             auscultation. CV Examination: normal. ASA Grade                             Assessment: II - A patient with mild systemic                             disease. After reviewing the risks and benefits,                             the patient was deemed in satisfactory condition to                             undergo the procedure. The anesthesia plan was to                             use general anesthesia. Immediately prior to                             administration of medications, the patient was                             re-assessed for adequacy to receive sedatives. The                             heart rate, respiratory rate, oxygen saturations,                             blood pressure, adequacy of pulmonary ventilation,                             and response to care were monitored throughout the                             procedure. The physical status of the patient was                             re-assessed after  the procedure. After obtaining                             informed consent, the scope was passed under direct                             vision. Throughout the procedure, the patient's                             blood pressure, pulse, and oxygen saturations were                             monitored continuously. The duodenoscope was                             introduced through the mouth, and used to inject                             contrast into and used for direct visualization of                             the bile duct. The ERCP was accomplished without                             difficulty. The patient tolerated the procedure                             well.                                                                                     Findings:        A  film of the right upper quadrant was unremarkable. Limited white        light views of the foregut were notable for a patent biliary        sphincterotomy. The bile duct was selectively deeply cannulated with the        short-nosed traction sphincterotome and later the 11.5 mm occlusion        balloon. Contrast was injected and I personally interpreted the bile        duct images. Ductal flow of contrast was adequate, image quality was        excellent and contrast extended thhroughout the hepatic ducts. The        intrahepatics appeared grossly healthy and decompressed as were the        bifurcation and the common. A mid inserting cystic was identified and        wire cannulated (Mod 22). Contrast injection into the cystic partially        opacified the gallbladder which had nunerous large filling defects        (stones). The cystic duct was dilated to 4mm over multiple stations. A 7        Fr by 12 cm transpapillary Zimmon stent with a single external flap and        a single internal pigtail was placed 12 cm into the gallbladder. Bile        flowed through the stent and the stent was in good position. The ventral        pancreatic duct  and orifice were selectively not interrogated.                                                                                     Impression:               - Mod 22 (difficult cannulation of a tortuous                             cystic)                             - Patent biliary sphincterotomy                             - Grossly unremarkable chlolangiography save for                             cholelithiasis                             - Uncomplicated dilation of the cystic duct                             - Successful placement of a transpapillary                             gallbladder stent (single pig tail with tail in the                             gallbladder and straight end just across the                             sphincterotomy)   Recommendation:           - General anesthesia recovery with probable                             discharge home this morning                             - Continue high dose PPI as prescribed; all                             medications and diet may resume without delay                             - Follow up with established providers as scheduled                             - Gallbladder stent may remain indefinitely; no                             plans for repeat ERCP at this moment                             - The findings and recommendations were discussed                             with the patient

## 2020-08-31 ENCOUNTER — PATIENT OUTREACH (OUTPATIENT)
Dept: GASTROENTEROLOGY | Facility: CLINIC | Age: 65
End: 2020-08-31

## 2020-08-31 NOTE — TELEPHONE ENCOUNTER
Post ERCP (8/24/2020) with Dr. Cordoba: Follow-up    Post procedure recommendations:   Continue high dose PPI as prescribed; all                             medications and diet may resume without delay                             - Follow up with established providers as scheduled                             - Gallbladder stent may remain indefinitely; no                             plans for repeat ERCP at this moment     Orders placed: none    Patient states: left message    Clinic contact and scheduling numbers verified for future questions/concerns.    Terri Reddy RN Care Coordinator

## 2020-09-15 DIAGNOSIS — R10.13 DYSPEPSIA: ICD-10-CM

## 2020-12-22 DIAGNOSIS — R10.13 DYSPEPSIA: ICD-10-CM

## 2021-01-03 ENCOUNTER — HEALTH MAINTENANCE LETTER (OUTPATIENT)
Age: 66
End: 2021-01-03

## 2021-04-25 ENCOUNTER — HEALTH MAINTENANCE LETTER (OUTPATIENT)
Age: 66
End: 2021-04-25

## 2021-05-03 ENCOUNTER — TELEPHONE (OUTPATIENT)
Dept: GASTROENTEROLOGY | Facility: CLINIC | Age: 66
End: 2021-05-03

## 2021-05-03 NOTE — TELEPHONE ENCOUNTER
Called Pt to remind her to schedule for MRI/MRCP.    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 The natural history of these were explained to the patient.     No answer. Left VM for Pt to call back.    Feng Walton LPN

## 2021-05-07 ENCOUNTER — TELEPHONE (OUTPATIENT)
Dept: GASTROENTEROLOGY | Facility: CLINIC | Age: 66
End: 2021-05-07

## 2021-05-07 NOTE — TELEPHONE ENCOUNTER
Attempted to contact Pt again to remind her to schedule for MRI/MRCP.     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 The natural history of these were explained to the patient.      No answer. Left VM for Pt to call back.     Feng Walton LPN

## 2021-05-11 ENCOUNTER — TELEPHONE (OUTPATIENT)
Dept: GASTROENTEROLOGY | Facility: CLINIC | Age: 66
End: 2021-05-11

## 2021-05-11 NOTE — TELEPHONE ENCOUNTER
Called Pt again to remind her of MRI/MRCP coming up due around 5/28/21. No answer. Left VM for Pt to call back.    Feng Walton LPN

## 2021-10-10 ENCOUNTER — HEALTH MAINTENANCE LETTER (OUTPATIENT)
Age: 66
End: 2021-10-10

## 2022-03-26 ENCOUNTER — HEALTH MAINTENANCE LETTER (OUTPATIENT)
Age: 67
End: 2022-03-26

## 2022-05-21 ENCOUNTER — HEALTH MAINTENANCE LETTER (OUTPATIENT)
Age: 67
End: 2022-05-21

## 2022-09-18 ENCOUNTER — HEALTH MAINTENANCE LETTER (OUTPATIENT)
Age: 67
End: 2022-09-18

## 2023-01-29 ENCOUNTER — HEALTH MAINTENANCE LETTER (OUTPATIENT)
Age: 68
End: 2023-01-29

## 2024-02-25 ENCOUNTER — HEALTH MAINTENANCE LETTER (OUTPATIENT)
Age: 69
End: 2024-02-25

## 2024-05-05 ENCOUNTER — HEALTH MAINTENANCE LETTER (OUTPATIENT)
Age: 69
End: 2024-05-05

## 2025-06-13 NOTE — ANESTHESIA CARE TRANSFER NOTE
Patient: Hazel Fatima    Procedure(s):  ENDOSCOPIC RETROGRADE CHOLANGIOPANCREATOGRAPHY    Diagnosis: RUQ abdominal pain [R10.11]  Diagnosis Additional Information: No value filed.    Anesthesia Type:   General     Note:  Airway :Face Mask  Patient transferred to:PACU  Comments: VSS. Airway and IV patent. Patient comfortable. Report to RN. Stable care transfer.Handoff Report: Identifed the Patient, Identified the Reponsible Provider, Reviewed the pertinent medical history, Discussed the surgical course, Reviewed Intra-OP anesthesia mangement and issues during anesthesia, Set expectations for post-procedure period and Allowed opportunity for questions and acknowledgement of understanding      Vitals: (Last set prior to Anesthesia Care Transfer)    CRNA VITALS  8/24/2020 1134 - 8/24/2020 1207      8/24/2020             NIBP:  (!) 147/110    Pulse:  92    NIBP Mean:  121    SpO2:  99 %    Resp Rate (observed):  (!) 1    Resp Rate (set):  10                Electronically Signed By: KEYANA Gutierrez CRNA  August 24, 2020  12:07 PM   2 weeks

## (undated) DEVICE — WIRE GUIDE ROADRUNNER X-SUPPORT .018X480CM STR TIP G22419

## (undated) DEVICE — INFLATION DEVICE BIG 60 ENDO-AN6012

## (undated) DEVICE — CATHETER ERCP GLO-TIP 5.5FRX200CM GT-1-T

## (undated) DEVICE — SUCTION MANIFOLD DORNOCH ULTRA CART UL-CL500

## (undated) DEVICE — RAD RX ISOVUE 300 (50ML) 61% IOPAMIDOL CHARGE PER ML

## (undated) DEVICE — CATH RETRIEVAL BALLOON EXTRACTOR PRO RX-S INJ ABOVE 9-12MM

## (undated) DEVICE — ENDO FUSION OMNI-TOME G31903

## (undated) DEVICE — ENDO TUBING CO2 SMARTCAP STERILE DISP 100145CO2EXT

## (undated) DEVICE — BIOPSY VALVE BIOSHIELD 00711135

## (undated) DEVICE — CATH BILIARY FUSION ERCP .035"X200CM DOME TIP

## (undated) DEVICE — KIT CONNECTOR FOR OLYMPUS ENDOSCOPES DEFENDO 100310

## (undated) DEVICE — SOL WATER IRRIG 1000ML BOTTLE 2F7114

## (undated) DEVICE — ENDO CATH BALLOON DILATION HURRICANE 04MMX4X180CM M00545900

## (undated) DEVICE — WIRE GUIDE 0.025"X270CM STR VISIGLIDE G-240-2527S

## (undated) DEVICE — ESU GROUND PAD ADULT W/CORD E7507

## (undated) DEVICE — ENDO ADPT CATH ROTATABLE SIDE ARM G22677

## (undated) DEVICE — BASIN SET MINOR DISP

## (undated) DEVICE — CANNULA BILIARY ERCP .035" TAPER TIP 3.2MM CHANNEL

## (undated) DEVICE — DRSG GAUZE 4X4" 3033

## (undated) DEVICE — PACK ENDOSCOPY GI CUSTOM UMMC

## (undated) DEVICE — SOL NACL 0.9% IRRIG 1000ML BOTTLE 2F7124

## (undated) DEVICE — ENDO DEVICE LOCKING AND BIOPSY CAP M00545261

## (undated) DEVICE — NDL CANNULA INTERLINK BLUNT 18GA

## (undated) DEVICE — STENT ADVANIX PANCREA PIGTAIL 3FRX13CM M00536850
Type: IMPLANTABLE DEVICE | Site: BILE DUCT | Status: NON-FUNCTIONAL
Removed: 2019-10-01

## (undated) DEVICE — WIRE GUIDE 0.025"X270CM ANG VISIGLIDE G-240-2527A

## (undated) DEVICE — KIT ENDO FIRST STEP DISINFECTANT 200ML W/POUCH EP-4

## (undated) DEVICE — ENDO BITE BLOCK ADULT OMNI-BLOC

## (undated) RX ORDER — MEPERIDINE HYDROCHLORIDE 25 MG/ML
INJECTION INTRAMUSCULAR; INTRAVENOUS; SUBCUTANEOUS
Status: DISPENSED
Start: 2020-08-24

## (undated) RX ORDER — FENTANYL CITRATE 50 UG/ML
INJECTION, SOLUTION INTRAMUSCULAR; INTRAVENOUS
Status: DISPENSED
Start: 2019-10-01

## (undated) RX ORDER — PROPOFOL 10 MG/ML
INJECTION, EMULSION INTRAVENOUS
Status: DISPENSED
Start: 2020-08-24

## (undated) RX ORDER — FENTANYL CITRATE 50 UG/ML
INJECTION, SOLUTION INTRAMUSCULAR; INTRAVENOUS
Status: DISPENSED
Start: 2020-08-24